# Patient Record
Sex: FEMALE | Race: BLACK OR AFRICAN AMERICAN | NOT HISPANIC OR LATINO | Employment: OTHER | URBAN - METROPOLITAN AREA
[De-identification: names, ages, dates, MRNs, and addresses within clinical notes are randomized per-mention and may not be internally consistent; named-entity substitution may affect disease eponyms.]

---

## 2017-10-26 ENCOUNTER — TRANSCRIBE ORDERS (OUTPATIENT)
Dept: OBSTETRICS AND GYNECOLOGY | Facility: CLINIC | Age: 62
End: 2017-10-26

## 2017-10-26 DIAGNOSIS — Z12.31 VISIT FOR SCREENING MAMMOGRAM: Primary | ICD-10-CM

## 2017-11-13 ENCOUNTER — APPOINTMENT (OUTPATIENT)
Dept: MAMMOGRAPHY | Facility: HOSPITAL | Age: 62
End: 2017-11-13
Attending: OBSTETRICS & GYNECOLOGY

## 2017-11-20 ENCOUNTER — HOSPITAL ENCOUNTER (OUTPATIENT)
Dept: MAMMOGRAPHY | Facility: HOSPITAL | Age: 62
Discharge: HOME | End: 2017-11-20
Attending: OBSTETRICS & GYNECOLOGY | Admitting: OBSTETRICS & GYNECOLOGY

## 2017-11-20 DIAGNOSIS — Z12.31 VISIT FOR SCREENING MAMMOGRAM: ICD-10-CM

## 2017-11-20 PROCEDURE — 77067 SCR MAMMO BI INCL CAD: CPT | Performed by: RADIOLOGY

## 2017-11-20 PROCEDURE — 77063 BREAST TOMOSYNTHESIS BI: CPT

## 2017-11-20 PROCEDURE — G0202 SCR MAMMO BI INCL CAD: HCPCS

## 2017-11-20 PROCEDURE — 77063 BREAST TOMOSYNTHESIS BI: CPT | Performed by: RADIOLOGY

## 2017-11-30 ENCOUNTER — OFFICE VISIT (OUTPATIENT)
Dept: OBSTETRICS AND GYNECOLOGY | Facility: CLINIC | Age: 62
End: 2017-11-30

## 2017-11-30 VITALS
WEIGHT: 242.2 LBS | DIASTOLIC BLOOD PRESSURE: 80 MMHG | BODY MASS INDEX: 38.92 KG/M2 | SYSTOLIC BLOOD PRESSURE: 138 MMHG | HEIGHT: 66 IN

## 2017-11-30 DIAGNOSIS — Z01.419 ENCOUNTER FOR GYNECOLOGICAL EXAMINATION WITHOUT ABNORMAL FINDING: ICD-10-CM

## 2017-11-30 DIAGNOSIS — Z78.0 MENOPAUSE: Primary | ICD-10-CM

## 2017-11-30 DIAGNOSIS — B37.31 RECURRENT CANDIDIASIS OF VAGINA: ICD-10-CM

## 2017-11-30 DIAGNOSIS — N95.2 VAGINAL ATROPHY: ICD-10-CM

## 2017-11-30 PROCEDURE — 99396 PREV VISIT EST AGE 40-64: CPT | Performed by: OBSTETRICS & GYNECOLOGY

## 2017-11-30 RX ORDER — INFLUENZA A VIRUS A/SINGAPORE/GP1908/2015 IVR-180A (H1N1) ANTIGEN (PROPIOLACTONE INACTIVATED), INFLUENZA A VIRUS A/SINGAPORE/INFIMH-16-0019/2016 IVR-186 (H3N2) ANTIGEN (PROPIOLACTONE INACTIVATED) AND INFLUENZA B VIRUS B/MARYLAND/15/2016 ANTIGEN (PROPIOLACTONE INACTIVATED) 15; 15; 15 UG/.5ML; UG/.5ML; UG/.5ML
INJECTION, SUSPENSION INTRAMUSCULAR
Refills: 0 | COMMUNITY
Start: 2017-10-30 | End: 2019-07-17

## 2017-11-30 RX ORDER — ESTRADIOL 0.1 MG/G
0.5 CREAM VAGINAL 2 TIMES WEEKLY
Qty: 42.5 G | Refills: 3 | Status: SHIPPED | OUTPATIENT
Start: 2017-11-30 | End: 2019-07-17 | Stop reason: ALTCHOICE

## 2017-11-30 RX ORDER — SPIRONOLACTONE 50 MG/1
1 TABLET, FILM COATED ORAL 2 TIMES DAILY
Refills: 0 | COMMUNITY
Start: 2017-11-12 | End: 2019-08-20 | Stop reason: SDUPTHER

## 2017-11-30 NOTE — PROGRESS NOTES
Chief Complaint   Patient presents with   • Gynecologic Exam   • Med Refill       Shireen Davenport is a 62 y.o. year old  presenting to be seen for her annual exam.This patient has previously had a vaginal hysterectomy/bilateral salpingo-oophorectomy.  She has also had a cholecystectomy.  She is menopausal and developed symptoms of vaginal atrophy.  She had Estrace cream, 0.5 g twice a week prescribed but discontinued this several months ago.  She is now symptomatic again.  She also has a history of recurrent vaginal candidiasis and is suppressed with boric acid suppositories, 600 mg 3 times a week successfully.  She has no other gynecologic complaints.    SCREENING TESTS    Year 2012 2016 2017   Age                         PAP     Neg.                    HPV high risk                         Mammogram   benign benign benign benign                   RENETTA score                         Breast MRI                         Lipids                         Vitamin D                         Colonoscopy                         DEXA  Frax (hip/any)    normal                     Ovarian Screen                             She exercises regularly: yes.  She wears her seat belt: yes.  She has concerns about domestic violence: no.  She has noticed changes in height: no    GYN screening history:  · Last pap: was done on approximately 2016 and the result was: normal PAP.  · Last mammogram: was done on approximately 2017 and the result was: Birads I (Normal).  · Last DEXA: was done on approximately 10/15/2015 and the results were: normal.    No Additional Complaints Reported    The following portions of the patient's history were reviewed and updated as appropriate:vital signs and   She  does not have any pertinent problems on file.  She  has a past surgical history that includes Hemorrhoid surgery; Cholecystectomy;  "cystoscopy bladder stone lithotripsy; vaginal hysterectomy salpingo oophorectomy (Bilateral, 1992); and Oophorectomy (Bilateral, 1992).  Her family history includes Ovarian cancer (age of onset: 55) in her mother. There is no history of Breast cancer.  She  reports that she has been smoking.  She has never used smokeless tobacco. She reports that she does not drink alcohol or use illicit drugs.  Current Outpatient Prescriptions   Medication Sig Dispense Refill   • AFLURIA PRESERVATIVE FREE 0.5 ML suspension prefilled syringe inject 0.5 milliliter intramuscularly  0   • amLODIPine (NORVASC) 5 MG tablet   0   • colestipol (COLESTID) 1 G tablet   0   • estradiol (ESTRACE VAGINAL) 0.1 MG/GM vaginal cream Insert 0.5 g into the vagina 2 (Two) Times a Week. 42.5 g 3   • fluticasone (FLONASE) 50 MCG/ACT nasal spray   0   • levothyroxine (SYNTHROID, LEVOTHROID) 150 MCG tablet   0   • sertraline (ZOLOFT) 50 MG tablet   0   • spironolactone (ALDACTONE) 50 MG tablet Take 1 tablet by mouth 2 (Two) Times a Day.  0     No current facility-administered medications for this visit.      She is allergic to penicillins..    Review of Systems  A comprehensive review of systems was taken.  Constitutional: negative for fever, chills, activity change, appetite change, fatigue and unexpected weight change.  Respiratory: negative  Cardiovascular: negative  Gastrointestinal: negative  Genitourinary:negative  Musculoskeletal:negative  Behavioral/Psych: negative       /80  Ht 66\" (167.6 cm)  Wt 242 lb 3.2 oz (110 kg)  BMI 39.09 kg/m2    Physical Exam    General:  alert; cooperative; well developed; well nourished  obese - Body mass index is 39.09 kg/(m^2).   Skin:  No suspicious lesions seen   Thyroid: normal to inspection and palpation   Lungs:  clear to auscultation bilaterally   Heart:  regular rate and rhythm, S1, S2 normal, no murmur, click, rub or gallop   Breasts:  Examined in supine position  Symmetric without masses or skin " dimpling  Nipples normal without inversion, lesions or discharge  There are no palpable axillary nodes   Abdomen: soft, non-tender; no masses  no umbilical or inginual hernias are present  no hepato-splenomegaly   Pelvis: Clinical staff was present for exam  External genitalia:  normal appearance of the external genitalia including Bartholin's and Barronett's glands.  Vaginal:  normal pink mucosa without prolapse or lesions.  Cervix:  absent.  Uterus:  absent.  Adnexa:  absent, bilateral.  Rectal:  anus visually normal appearing. recto-vaginal exam unremarkable and confirms findings;     Lab Review   No data reviewed    Imaging  Mammogram results- benign, and DEXA- normal         ASSESSMENT  Problems Addressed this Visit        Genitourinary    Menopause - Primary    Vaginal atrophy    Relevant Medications    estradiol (ESTRACE VAGINAL) 0.1 MG/GM vaginal cream      Other Visit Diagnoses     Encounter for gynecological examination without abnormal finding        Recurrent candidiasis of vagina              PLAN    Medications prescribed this encounter:    New Medications Ordered This Visit   Medications   • AFLURIA PRESERVATIVE FREE 0.5 ML suspension prefilled syringe     Sig: inject 0.5 milliliter intramuscularly     Refill:  0   • spironolactone (ALDACTONE) 50 MG tablet     Sig: Take 1 tablet by mouth 2 (Two) Times a Day.     Refill:  0   • estradiol (ESTRACE VAGINAL) 0.1 MG/GM vaginal cream     Sig: Insert 0.5 g into the vagina 2 (Two) Times a Week.     Dispense:  42.5 g     Refill:  3   · Boric acid suppositories 3 times a week  · Calcium, 600 mg/ Vit. D, 400 IU daily; regular weight-bearing exercise  · Follow up: 12 month(s)  *Please note that portions of this documentation may have been completed with a voice recognition program.  Efforts were made to edit this dictation, but occasional words may have been mistranscribed.       This note was electronically signed.    ANH Guillory MD  November 30, 2017  10:30  AM

## 2018-12-03 ENCOUNTER — TRANSCRIBE ORDERS (OUTPATIENT)
Dept: OBSTETRICS AND GYNECOLOGY | Facility: CLINIC | Age: 63
End: 2018-12-03

## 2018-12-03 DIAGNOSIS — Z12.31 VISIT FOR SCREENING MAMMOGRAM: Primary | ICD-10-CM

## 2019-07-17 ENCOUNTER — TELEPHONE (OUTPATIENT)
Dept: OBSTETRICS AND GYNECOLOGY | Facility: CLINIC | Age: 64
End: 2019-07-17

## 2019-07-17 ENCOUNTER — OFFICE VISIT (OUTPATIENT)
Dept: OBSTETRICS AND GYNECOLOGY | Facility: CLINIC | Age: 64
End: 2019-07-17

## 2019-07-17 VITALS
HEIGHT: 66 IN | SYSTOLIC BLOOD PRESSURE: 102 MMHG | BODY MASS INDEX: 37.19 KG/M2 | WEIGHT: 231.4 LBS | DIASTOLIC BLOOD PRESSURE: 56 MMHG

## 2019-07-17 DIAGNOSIS — Z01.411 ENCOUNTER FOR GYNECOLOGICAL EXAMINATION WITH ABNORMAL FINDING: ICD-10-CM

## 2019-07-17 DIAGNOSIS — N95.2 VAGINAL ATROPHY: ICD-10-CM

## 2019-07-17 DIAGNOSIS — B96.89 BV (BACTERIAL VAGINOSIS): ICD-10-CM

## 2019-07-17 DIAGNOSIS — R35.0 URINARY FREQUENCY: ICD-10-CM

## 2019-07-17 DIAGNOSIS — Z78.0 MENOPAUSE: Primary | ICD-10-CM

## 2019-07-17 DIAGNOSIS — N76.0 BV (BACTERIAL VAGINOSIS): ICD-10-CM

## 2019-07-17 PROCEDURE — 87210 SMEAR WET MOUNT SALINE/INK: CPT | Performed by: OBSTETRICS & GYNECOLOGY

## 2019-07-17 PROCEDURE — G0101 CA SCREEN;PELVIC/BREAST EXAM: HCPCS | Performed by: OBSTETRICS & GYNECOLOGY

## 2019-07-17 PROCEDURE — 83986 ASSAY PH BODY FLUID NOS: CPT | Performed by: OBSTETRICS & GYNECOLOGY

## 2019-07-17 RX ORDER — METRONIDAZOLE 7.5 MG/G
GEL VAGINAL
Qty: 70 G | Refills: 0 | Status: SHIPPED | OUTPATIENT
Start: 2019-07-17 | End: 2019-08-20

## 2019-07-17 NOTE — PROGRESS NOTES
Chief Complaint   Patient presents with   • Gynecologic Exam     c/o vaginal malodor   • Med Refill       Shireen Davenport is a 64 y.o. year old  presenting to be seen for her annual exam.  This patient has previously had a vaginal hysterectomy/bilateral salpingo-oophorectomy in .  She has had a cholecystectomy.  She does not use estrogen replacement therapy.  She denies significant menopausal symptoms.  She is treated for vaginal atrophy with estrogen vaginal cream, 0.5 g twice a week with relief of symptoms.  She has no side effects on this medication.  She has complaints of urinary frequency and malodor of her urine and vaginal secretions.  She has been seen by Dr. Susan Jr. and his been given nitrofurantoin 50 mg daily to take for suppression of recurrent cystitis.  She has not been taking this medication.  She denies bowel symptoms.    SCREENING TESTS    Year 2012   Age                         PAP     Neg.                    HPV high risk                         Mammogram      benign                   RENETTA score                         Breast MRI                         Lipids                         Vitamin D                         Colonoscopy                         DEXA  Frax (hip/any)    normal                     Ovarian Screen                             She exercises regularly: no.  She wears her seat belt: yes.  She has concerns about domestic violence: no.  She has noticed changes in height: no    GYN screening history:  · Last mammogram: was done on approximately 2017 and the result was: Birads I (Normal).  · Last DEXA: was done on approximately 10/15/2015 and the results were: normal.    No Additional Complaints Reported    The following portions of the patient's history were reviewed and updated as appropriate:vital signs and   She  has a past medical history of Chronic  "diarrhea, Depression, History of nephrolithiasis, Hypertension, Hypothyroidism, Menopause, and Vaginal atrophy.  She does not have any pertinent problems on file.  She  has a past surgical history that includes Hemorrhoid surgery; Cholecystectomy; cystoscopy bladder stone lithotripsy; vaginal hysterectomy salpingo oophorectomy (Bilateral, 1992); and Oophorectomy (Bilateral, 1992).  Her family history includes Ovarian cancer (age of onset: 55) in her mother.  She  reports that she has been smoking.  She has never used smokeless tobacco. She reports that she does not drink alcohol or use drugs.  Current Outpatient Medications   Medication Sig Dispense Refill   • amLODIPine (NORVASC) 5 MG tablet   0   • colestipol (COLESTID) 1 G tablet   0   • conjugated estrogens (PREMARIN) 0.625 MG/GM vaginal cream Insert 0.5 grams intravaginally, twice a week 30 g 2   • fluticasone (FLONASE) 50 MCG/ACT nasal spray   0   • levothyroxine (SYNTHROID, LEVOTHROID) 150 MCG tablet   0   • metroNIDAZOLE (METROGEL VAGINAL) 0.75 % vaginal gel Insert one application intravaginally at night for 5 days 70 g 0   • sertraline (ZOLOFT) 50 MG tablet   0   • spironolactone (ALDACTONE) 50 MG tablet Take 1 tablet by mouth 2 (Two) Times a Day.  0     No current facility-administered medications for this visit.      She is allergic to codeine; penicillins; and sulfa antibiotics..    Review of Systems  A comprehensive review of systems was taken.  Constitutional: negative for fever, chills, activity change, appetite change, fatigue and unexpected weight change.  Respiratory: negative  Cardiovascular: negative  Gastrointestinal: negative  Genitourinary:positive for frequency and urgency and malodor  Musculoskeletal:negative  Behavioral/Psych: negative       /56   Ht 167.6 cm (66\")   Wt 105 kg (231 lb 6.4 oz)   Breastfeeding? No   BMI 37.35 kg/m²     Physical Exam    General:  alert; cooperative; well developed; well nourished  obese - Body mass " index is 37.35 kg/m².   Skin:  No suspicious lesions seen   Thyroid: normal to inspection and palpation   Lungs:  clear to auscultation bilaterally   Heart:  RRR with no murmur, rub or gallop   Breasts:  Examined in supine position  Symmetric without masses or skin dimpling  Nipples normal without inversion, lesions or discharge  There are no palpable axillary nodes   Abdomen: soft, non-tender; no masses  no umbilical or inguinal hernias are present  no hepato-splenomegaly   Pelvis: Clinical staff was present for exam  External genitalia:  normal appearance of the external genitalia including Bartholin's and Holcomb's glands.  Vaginal:  discharge present -  watery; pH = 5.5 wet prep done: clue cells are present; Cath urine obtained  Cervix:  absent.  Uterus:  absent.  Adnexa:  absent, bilateral.  Rectal:  anus visually normal appearing. recto-vaginal exam unremarkable and confirms findings;     Lab Review   No data reviewed    Imaging  Mammogram results- Birads I         ASSESSMENT  Problems Addressed this Visit        Genitourinary    Menopause - Primary    Vaginal atrophy    Relevant Medications    conjugated estrogens (PREMARIN) 0.625 MG/GM vaginal cream      Other Visit Diagnoses     Urinary frequency        Relevant Orders    Urinalysis With Culture If Indicated - Urine, Catheter In/Out    BV (bacterial vaginosis)        Relevant Medications    metroNIDAZOLE (METROGEL VAGINAL) 0.75 % vaginal gel    Encounter for gynecological examination with abnormal finding              PLAN    Medications prescribed this encounter:    New Medications Ordered This Visit   Medications   • conjugated estrogens (PREMARIN) 0.625 MG/GM vaginal cream     Sig: Insert 0.5 grams intravaginally, twice a week     Dispense:  30 g     Refill:  2   • metroNIDAZOLE (METROGEL VAGINAL) 0.75 % vaginal gel     Sig: Insert one application intravaginally at night for 5 days     Dispense:  70 g     Refill:  0   · Catheterized urine specimen  obtained.  If the patient has infection I will treat the cystitis with ciprofloxacin, 500 mg twice daily for 5 days since she is allergic to sulfa drugs.  · I have encouraged the patient to resume her daily nitrofurantoin suppression in a dose of 50 mg daily.  · Monthly self breast assessment and annual breast imaging  · Calcium, 600 mg/ Vit. D, 400 IU daily; regular weight-bearing exercise  · Follow up: 12 month(s)  *Please note that portions of this documentation may have been completed with a voice recognition program.  Efforts were made to edit this dictation, but occasional words may have been mistranscribed.       This note was electronically signed.    ANH Guillory MD  July 17, 2019  10:27 AM

## 2019-07-17 NOTE — TELEPHONE ENCOUNTER
Patient was given a prescription for Metrogel vaginal to treat bacterial vaginosis today.  A cath urine was also sent to the lab and the results are pending.  The patient calls to request oral metronidazole because the pharmacist told her it would be cheaper.  I explained to her that if she has a UTI and we need to treat that with antibiotics, it would be difficult for her to tolerate both of those medications orally.  She will wait until tomorrow to find out urinalysis results. If she does not have UTI, will request oral metronidazole.  If she does have a UTI, she will use Metrogel vaginal along with whatever oral antibiotic Dr. Guillory prescribes to treat UTI.

## 2019-07-18 ENCOUNTER — TELEPHONE (OUTPATIENT)
Dept: OBSTETRICS AND GYNECOLOGY | Facility: CLINIC | Age: 64
End: 2019-07-18

## 2019-07-18 DIAGNOSIS — N30.00 ACUTE CYSTITIS WITHOUT HEMATURIA: Primary | ICD-10-CM

## 2019-07-18 RX ORDER — CIPROFLOXACIN 500 MG/1
500 TABLET, FILM COATED ORAL 2 TIMES DAILY
Qty: 10 TABLET | Refills: 0 | Status: SHIPPED | OUTPATIENT
Start: 2019-07-18 | End: 2019-07-23

## 2019-07-18 NOTE — TELEPHONE ENCOUNTER
No answer on cell number, but I left a message asking Shireen to call the office in the morning.  Home number disconnected.

## 2019-07-19 NOTE — TELEPHONE ENCOUNTER
Patient was informed that she has a urinary tract infection.  Dr. Guillory has sent a prescription for Cipro to the patient's pharmacy.  She will take as directed and was instructed to contact the office if she has problems after finishing medication.

## 2019-07-19 NOTE — TELEPHONE ENCOUNTER
Attempted to contact patient by telephone.  No answer, but I left a message asking Shireen to return my call.

## 2019-08-20 ENCOUNTER — OFFICE VISIT (OUTPATIENT)
Dept: INTERNAL MEDICINE | Facility: CLINIC | Age: 64
End: 2019-08-20

## 2019-08-20 VITALS
HEART RATE: 63 BPM | DIASTOLIC BLOOD PRESSURE: 76 MMHG | HEIGHT: 66 IN | TEMPERATURE: 98 F | BODY MASS INDEX: 37.61 KG/M2 | OXYGEN SATURATION: 99 % | WEIGHT: 234 LBS | RESPIRATION RATE: 20 BRPM | SYSTOLIC BLOOD PRESSURE: 122 MMHG

## 2019-08-20 DIAGNOSIS — J30.89 ENVIRONMENTAL AND SEASONAL ALLERGIES: ICD-10-CM

## 2019-08-20 DIAGNOSIS — N32.89 BLADDER SPASMS: ICD-10-CM

## 2019-08-20 DIAGNOSIS — M53.82 DECREASED ROM OF INTERVERTEBRAL DISCS OF CERVICAL SPINE: ICD-10-CM

## 2019-08-20 DIAGNOSIS — N95.2 VAGINAL ATROPHY: ICD-10-CM

## 2019-08-20 DIAGNOSIS — N88.8 CERVICAL CYST: Primary | ICD-10-CM

## 2019-08-20 DIAGNOSIS — F32.A DEPRESSION, UNSPECIFIED DEPRESSION TYPE: ICD-10-CM

## 2019-08-20 DIAGNOSIS — R19.7 DIARRHEA, UNSPECIFIED TYPE: ICD-10-CM

## 2019-08-20 DIAGNOSIS — I10 ESSENTIAL HYPERTENSION: ICD-10-CM

## 2019-08-20 DIAGNOSIS — M54.2 CERVICAL PAIN: ICD-10-CM

## 2019-08-20 DIAGNOSIS — E03.9 HYPOTHYROIDISM, UNSPECIFIED TYPE: ICD-10-CM

## 2019-08-20 DIAGNOSIS — L40.9 PSORIASIS: ICD-10-CM

## 2019-08-20 PROCEDURE — 99204 OFFICE O/P NEW MOD 45 MIN: CPT | Performed by: NURSE PRACTITIONER

## 2019-08-20 RX ORDER — LEVOTHYROXINE SODIUM 0.12 MG/1
125 TABLET ORAL DAILY
Qty: 90 TABLET | Refills: 2 | Status: SHIPPED | OUTPATIENT
Start: 2019-08-20 | End: 2020-01-03 | Stop reason: SDUPTHER

## 2019-08-20 RX ORDER — MONTELUKAST SODIUM 4 MG/1
1 TABLET, CHEWABLE ORAL 2 TIMES DAILY
Qty: 180 TABLET | Refills: 2 | Status: SHIPPED | OUTPATIENT
Start: 2019-08-20 | End: 2020-01-24 | Stop reason: SDUPTHER

## 2019-08-20 RX ORDER — CETIRIZINE HYDROCHLORIDE, PSEUDOEPHEDRINE HYDROCHLORIDE 5; 120 MG/1; MG/1
TABLET, FILM COATED, EXTENDED RELEASE ORAL
Status: ON HOLD | COMMUNITY
End: 2022-06-16

## 2019-08-20 RX ORDER — ASPIRIN 81 MG/1
81 TABLET, CHEWABLE ORAL DAILY
COMMUNITY

## 2019-08-20 RX ORDER — CLOBETASOL PROPIONATE 0.46 MG/ML
SOLUTION TOPICAL
Refills: 0 | COMMUNITY
Start: 2019-08-03 | End: 2019-08-20

## 2019-08-20 RX ORDER — METHENAMINE HIPPURATE 1000 MG/1
1 TABLET ORAL 2 TIMES DAILY WITH MEALS
COMMUNITY
End: 2019-08-20 | Stop reason: SDUPTHER

## 2019-08-20 RX ORDER — IRBESARTAN 150 MG/1
150 TABLET ORAL DAILY
Refills: 0 | COMMUNITY
Start: 2019-07-11 | End: 2019-08-20 | Stop reason: SDUPTHER

## 2019-08-20 RX ORDER — SPIRONOLACTONE 50 MG/1
50 TABLET, FILM COATED ORAL DAILY
Qty: 90 TABLET | Refills: 2 | Status: SHIPPED | OUTPATIENT
Start: 2019-08-20

## 2019-08-20 RX ORDER — IRBESARTAN 150 MG/1
150 TABLET ORAL DAILY
Qty: 90 TABLET | Refills: 2 | Status: SHIPPED | OUTPATIENT
Start: 2019-08-20

## 2019-08-20 RX ORDER — FLUTICASONE PROPIONATE 50 MCG
2 SPRAY, SUSPENSION (ML) NASAL DAILY
Qty: 3 BOTTLE | Refills: 2 | Status: SHIPPED | OUTPATIENT
Start: 2019-08-20

## 2019-08-20 RX ORDER — AMLODIPINE BESYLATE 5 MG/1
5 TABLET ORAL DAILY
Qty: 90 TABLET | Refills: 2 | Status: SHIPPED | OUTPATIENT
Start: 2019-08-20 | End: 2020-01-10 | Stop reason: SDUPTHER

## 2019-08-20 RX ORDER — LEVOTHYROXINE SODIUM 0.12 MG/1
TABLET ORAL
Refills: 0 | COMMUNITY
Start: 2019-08-03 | End: 2019-08-20 | Stop reason: SDUPTHER

## 2019-08-20 RX ORDER — METHENAMINE HIPPURATE 1000 MG/1
1 TABLET ORAL 2 TIMES DAILY WITH MEALS
Qty: 180 TABLET | Refills: 2 | Status: ON HOLD | OUTPATIENT
Start: 2019-08-20 | End: 2022-06-16

## 2019-08-20 RX ORDER — CLOBETASOL PROPIONATE 0.46 MG/ML
SOLUTION TOPICAL 2 TIMES DAILY
Qty: 150 ML | Refills: 2 | Status: ON HOLD | OUTPATIENT
Start: 2019-08-20 | End: 2022-06-16

## 2019-08-20 NOTE — PROGRESS NOTES
Subjective   Chief Complaint   Patient presents with   • Hypertension   • Cyst      Shireen Davenport is a 64 y.o. female here today to establish care.  She developed a cyst at the base of the back of her neck about 1 year ago and she has noticed this has grown in size especially in the past month.  She has now developed extreme tenderness in this area with decreased range of motion of her neck.  She has not had any imaging or assessment of this.  She has been experiencing an increase in depression and anxiety recently due to some environmental changes.  Her  passed away about 1 year ago when she is having to sell her home.  She has been taking Zoloft and this does help with her depression some.  She has not been sleeping well.  She has history of hypertension that has been currently stable on medications.  She has been taking same dose of Synthroid for hypothyroidism for several years.  She has history of bladder spasms and has been prescribed HipRex that greatly helps her symptoms.  She has psoriasis and has been using a clobetasol solution that helps especially on her scalp.  She was previously prescribed Soriatane but stopped taking this due to improved symptoms.  She had her gallbladder removed several years ago and has had some diarrhea since.  She has been taking Colestid which helps the diarrhea.  She has postmenopausal symptoms with vaginal atrophy and has been using Premarin cream that helps with vaginal dryness.  She denies any shortness of air or chest pain today.      The following portions of the patient's history were reviewed and updated as appropriate: allergies, current medications, past family history, past medical history, past social history, past surgical history and problem list.    Review of Systems   Constitutional: Positive for activity change and fatigue. Negative for appetite change, chills, diaphoresis, fever, unexpected weight gain and unexpected weight loss.   HENT: Negative for  "ear discharge, ear pain, mouth sores, nosebleeds, sinus pressure, sneezing and sore throat.    Eyes: Negative for pain, discharge and itching.   Respiratory: Negative for cough, chest tightness, shortness of breath and wheezing.    Cardiovascular: Negative for chest pain, palpitations and leg swelling.   Gastrointestinal: Positive for diarrhea. Negative for abdominal pain, constipation, nausea and vomiting.   Endocrine: Negative for heat intolerance, polydipsia and polyphagia.   Genitourinary: Negative for dysuria, flank pain, frequency, hematuria and urgency.   Musculoskeletal: Positive for arthralgias, back pain, myalgias, neck pain and neck stiffness. Negative for gait problem and joint swelling.   Skin: Positive for rash. Negative for color change and pallor.   Allergic/Immunologic: Positive for environmental allergies. Negative for immunocompromised state.   Neurological: Negative for seizures, speech difficulty, weakness and numbness.   Hematological: Negative for adenopathy.   Psychiatric/Behavioral: Positive for sleep disturbance and depressed mood. Negative for agitation and decreased concentration. The patient is nervous/anxious.        Current Outpatient Medications on File Prior to Visit   Medication Sig   • aspirin 81 MG chewable tablet Chew 81 mg Daily.   • cetirizine-pseudoephedrine (ZYRTEC-D ALLERGY & CONGESTION) 5-120 MG per 12 hr tablet Zyrtec-D 5 mg-120 mg tablet,extended release   Daily     No current facility-administered medications on file prior to visit.        Objective   Vitals:    08/20/19 1422   BP: 122/76   Pulse: 63   Resp: 20   Temp: 98 °F (36.7 °C)   SpO2: 99%   Weight: 106 kg (234 lb)   Height: 167.6 cm (66\")     Body mass index is 37.77 kg/m².    Physical Exam   Constitutional: She is oriented to person, place, and time. She appears well-developed and well-nourished.   HENT:   Head: Normocephalic and atraumatic.   Nose: Nose normal.   Eyes: EOM are normal. Pupils are equal, round, " and reactive to light.   Neck: Trachea normal and normal range of motion. No thyromegaly present.   Cardiovascular: Normal rate, regular rhythm and normal heart sounds.   Pulmonary/Chest: Effort normal and breath sounds normal.   Abdominal: Soft. Bowel sounds are normal. There is no tenderness.   Musculoskeletal:        Cervical back: She exhibits decreased range of motion, tenderness and pain.   Soft cyst sitting at C7 with tenderness on palpation.    Neurological: She is alert and oriented to person, place, and time. She has normal strength. GCS eye subscore is 4. GCS verbal subscore is 5. GCS motor subscore is 6.   Skin: Skin is warm and dry.   Psychiatric: Her speech is normal and behavior is normal. Thought content normal. Her mood appears anxious. Cognition and memory are normal. She exhibits a depressed mood.   Vitals reviewed.      Assessment/Plan   Shireen was seen today for hypertension and cyst.    Diagnoses and all orders for this visit:    Cervical cyst  - new condition requiring further work-up  -     MRI Cervical Spine Without Contrast; Future    Depression, unspecified depression type -chronic condition that has currently worsened but patient does not want to change treatment regimen at this time.  Will revisit this at next appointment.  -     sertraline (ZOLOFT) 50 MG tablet; Take 1 tablet by mouth Daily.    Essential hypertension -chronic condition, stable  -     amLODIPine (NORVASC) 5 MG tablet; Take 1 tablet by mouth Daily.  -     irbesartan (AVAPRO) 150 MG tablet; Take 1 tablet by mouth Daily.  -     spironolactone (ALDACTONE) 50 MG tablet; Take 1 tablet by mouth Daily.    Environmental and seasonal allergies - chronic condition, stable  -     fluticasone (FLONASE) 50 MCG/ACT nasal spray; 2 sprays into the nostril(s) as directed by provider Daily.    Vaginal atrophy -chronic condition, stable  -     conjugated estrogens (PREMARIN) 0.625 MG/GM vaginal cream; 0.5 grams by vaginal route 2 X  week    Hypothyroidism, unspecified type -chronic condition, stable  -     levothyroxine (SYNTHROID, LEVOTHROID) 125 MCG tablet; Take 1 tablet by mouth Daily.    Bladder spasms -chronic condition, stable  -     methenamine (HIPREX) 1 g tablet; Take 1 tablet by mouth 2 (Two) Times a Day With Meals.    Diarrhea, unspecified type -chronic condition, stable  -     colestipol (COLESTID) 1 g tablet; Take 1 tablet by mouth 2 (Two) Times a Day.    Psoriasis -chronic condition, stable  -     clobetasol (TEMOVATE) 0.05 % external solution; Apply  topically to the appropriate area as directed 2 (Two) Times a Day.    Cervical pain -new condition requiring further work-up  -     MRI Cervical Spine Without Contrast; Future    Decreased ROM of intervertebral discs of cervical spine - new condition requiring further work-up  -     MRI Cervical Spine Without Contrast; Future           Current Outpatient Medications:   •  amLODIPine (NORVASC) 5 MG tablet, Take 1 tablet by mouth Daily., Disp: 90 tablet, Rfl: 2  •  aspirin 81 MG chewable tablet, Chew 81 mg Daily., Disp: , Rfl:   •  cetirizine-pseudoephedrine (ZYRTEC-D ALLERGY & CONGESTION) 5-120 MG per 12 hr tablet, Zyrtec-D 5 mg-120 mg tablet,extended release  Daily, Disp: , Rfl:   •  clobetasol (TEMOVATE) 0.05 % external solution, Apply  topically to the appropriate area as directed 2 (Two) Times a Day., Disp: 150 mL, Rfl: 2  •  colestipol (COLESTID) 1 g tablet, Take 1 tablet by mouth 2 (Two) Times a Day., Disp: 180 tablet, Rfl: 2  •  irbesartan (AVAPRO) 150 MG tablet, Take 1 tablet by mouth Daily., Disp: 90 tablet, Rfl: 2  •  levothyroxine (SYNTHROID, LEVOTHROID) 125 MCG tablet, Take 1 tablet by mouth Daily., Disp: 90 tablet, Rfl: 2  •  methenamine (HIPREX) 1 g tablet, Take 1 tablet by mouth 2 (Two) Times a Day With Meals., Disp: 180 tablet, Rfl: 2  •  sertraline (ZOLOFT) 50 MG tablet, Take 1 tablet by mouth Daily., Disp: 90 tablet, Rfl: 2  •  spironolactone (ALDACTONE) 50 MG  tablet, Take 1 tablet by mouth Daily., Disp: 90 tablet, Rfl: 2  •  conjugated estrogens (PREMARIN) 0.625 MG/GM vaginal cream, 0.5 grams by vaginal route 2 X week, Disp: 6 each, Rfl: 2  •  fluticasone (FLONASE) 50 MCG/ACT nasal spray, 2 sprays into the nostril(s) as directed by provider Daily., Disp: 3 bottle, Rfl: 2       Plan of care reviewed with the patient at the conclusion of today's visit.  Education was provided regarding diagnosis, management, and any prescribed or recommended OTC medications.  Patient verbalized understanding of and agreement with management plan.     Return in about 6 months (around 2/20/2020), or if symptoms worsen or fail to improve.      Sharon Veronica, APRN

## 2019-08-21 ENCOUNTER — LAB (OUTPATIENT)
Dept: INTERNAL MEDICINE | Facility: CLINIC | Age: 64
End: 2019-08-21

## 2019-08-21 DIAGNOSIS — Z00.00 ROUTINE GENERAL MEDICAL EXAMINATION AT A HEALTH CARE FACILITY: ICD-10-CM

## 2019-08-21 DIAGNOSIS — E03.8 OTHER SPECIFIED HYPOTHYROIDISM: ICD-10-CM

## 2019-08-21 DIAGNOSIS — E55.9 VITAMIN D DEFICIENCY: ICD-10-CM

## 2019-08-21 DIAGNOSIS — I10 ESSENTIAL HYPERTENSION: Primary | ICD-10-CM

## 2019-08-21 DIAGNOSIS — Z13.0 SCREENING FOR BLOOD DISEASE: ICD-10-CM

## 2019-08-22 LAB
25(OH)D3+25(OH)D2 SERPL-MCNC: 32.3 NG/ML (ref 30–100)
ALBUMIN SERPL-MCNC: 4 G/DL (ref 3.5–5.2)
ALBUMIN/GLOB SERPL: 1.5 G/DL
ALP SERPL-CCNC: 95 U/L (ref 39–117)
ALT SERPL-CCNC: 9 U/L (ref 1–33)
AST SERPL-CCNC: 15 U/L (ref 1–32)
BASOPHILS # BLD AUTO: 0.03 10*3/MM3 (ref 0–0.2)
BASOPHILS NFR BLD AUTO: 0.5 % (ref 0–1.5)
BILIRUB SERPL-MCNC: 0.2 MG/DL (ref 0.2–1.2)
BUN SERPL-MCNC: 12 MG/DL (ref 8–23)
BUN/CREAT SERPL: 15.8 (ref 7–25)
CALCIUM SERPL-MCNC: 9 MG/DL (ref 8.6–10.5)
CHLORIDE SERPL-SCNC: 107 MMOL/L (ref 98–107)
CHOLEST SERPL-MCNC: 152 MG/DL (ref 0–200)
CO2 SERPL-SCNC: 22.7 MMOL/L (ref 22–29)
CREAT SERPL-MCNC: 0.76 MG/DL (ref 0.57–1)
EOSINOPHIL # BLD AUTO: 0.11 10*3/MM3 (ref 0–0.4)
EOSINOPHIL NFR BLD AUTO: 1.9 % (ref 0.3–6.2)
ERYTHROCYTE [DISTWIDTH] IN BLOOD BY AUTOMATED COUNT: 14.6 % (ref 12.3–15.4)
FOLATE SERPL-MCNC: >20 NG/ML (ref 4.78–24.2)
GLOBULIN SER CALC-MCNC: 2.6 GM/DL
GLUCOSE SERPL-MCNC: 89 MG/DL (ref 65–99)
HCT VFR BLD AUTO: 37.7 % (ref 34–46.6)
HDLC SERPL-MCNC: 49 MG/DL (ref 40–60)
HGB BLD-MCNC: 11.3 G/DL (ref 12–15.9)
IMM GRANULOCYTES # BLD AUTO: 0.01 10*3/MM3 (ref 0–0.05)
IMM GRANULOCYTES NFR BLD AUTO: 0.2 % (ref 0–0.5)
LDLC SERPL CALC-MCNC: 85 MG/DL (ref 0–100)
LYMPHOCYTES # BLD AUTO: 2.48 10*3/MM3 (ref 0.7–3.1)
LYMPHOCYTES NFR BLD AUTO: 42.6 % (ref 19.6–45.3)
MCH RBC QN AUTO: 26.7 PG (ref 26.6–33)
MCHC RBC AUTO-ENTMCNC: 30 G/DL (ref 31.5–35.7)
MCV RBC AUTO: 89.1 FL (ref 79–97)
MONOCYTES # BLD AUTO: 0.44 10*3/MM3 (ref 0.1–0.9)
MONOCYTES NFR BLD AUTO: 7.6 % (ref 5–12)
NEUTROPHILS # BLD AUTO: 2.75 10*3/MM3 (ref 1.7–7)
NEUTROPHILS NFR BLD AUTO: 47.2 % (ref 42.7–76)
NRBC BLD AUTO-RTO: 0 /100 WBC (ref 0–0.2)
PLATELET # BLD AUTO: 339 10*3/MM3 (ref 140–450)
POTASSIUM SERPL-SCNC: 4.1 MMOL/L (ref 3.5–5.2)
PROT SERPL-MCNC: 6.6 G/DL (ref 6–8.5)
RBC # BLD AUTO: 4.23 10*6/MM3 (ref 3.77–5.28)
SODIUM SERPL-SCNC: 144 MMOL/L (ref 136–145)
TRIGL SERPL-MCNC: 92 MG/DL (ref 0–150)
TSH SERPL DL<=0.005 MIU/L-ACNC: 1.12 MIU/ML (ref 0.27–4.2)
VIT B12 SERPL-MCNC: 430 PG/ML (ref 211–946)
VLDLC SERPL CALC-MCNC: 18.4 MG/DL
WBC # BLD AUTO: 5.82 10*3/MM3 (ref 3.4–10.8)

## 2019-08-22 NOTE — PROGRESS NOTES
Please contact patient and advise that she is borderline anemic so increase your iron intake by eating foods high in iron, taking an over the counter iron supplement once in the morning, or cooking from cast iron skillet. All other labs are normal and look great.

## 2019-08-23 PROBLEM — J30.89 ENVIRONMENTAL AND SEASONAL ALLERGIES: Status: ACTIVE | Noted: 2019-08-23

## 2019-08-23 PROBLEM — F32.A DEPRESSION: Status: ACTIVE | Noted: 2019-08-23

## 2019-08-23 PROBLEM — N32.89 BLADDER SPASMS: Status: ACTIVE | Noted: 2019-08-23

## 2019-08-23 PROBLEM — L40.9 PSORIASIS: Status: ACTIVE | Noted: 2019-08-23

## 2019-08-23 PROBLEM — R19.7 DIARRHEA: Status: ACTIVE | Noted: 2019-08-23

## 2019-08-28 ENCOUNTER — TELEPHONE (OUTPATIENT)
Dept: INTERNAL MEDICINE | Facility: CLINIC | Age: 64
End: 2019-08-28

## 2019-09-25 ENCOUNTER — TELEPHONE (OUTPATIENT)
Dept: OBSTETRICS AND GYNECOLOGY | Facility: CLINIC | Age: 64
End: 2019-09-25

## 2019-09-25 NOTE — TELEPHONE ENCOUNTER
Patient calls with recurrent vaginal odor.  She was treated for BV, and this resolved, but has returned.  She will see Dr. Guillory tomorrow afternoon for evaluation.

## 2019-09-26 ENCOUNTER — OFFICE VISIT (OUTPATIENT)
Dept: OBSTETRICS AND GYNECOLOGY | Facility: CLINIC | Age: 64
End: 2019-09-26

## 2019-09-26 ENCOUNTER — LAB REQUISITION (OUTPATIENT)
Dept: LAB | Facility: HOSPITAL | Age: 64
End: 2019-09-26

## 2019-09-26 VITALS
SYSTOLIC BLOOD PRESSURE: 120 MMHG | BODY MASS INDEX: 37.22 KG/M2 | DIASTOLIC BLOOD PRESSURE: 78 MMHG | HEIGHT: 66 IN | WEIGHT: 231.6 LBS

## 2019-09-26 DIAGNOSIS — B37.31 VAGINAL CANDIDIASIS: ICD-10-CM

## 2019-09-26 DIAGNOSIS — Z00.00 ROUTINE GENERAL MEDICAL EXAMINATION AT A HEALTH CARE FACILITY: ICD-10-CM

## 2019-09-26 DIAGNOSIS — N95.2 VAGINAL ATROPHY: ICD-10-CM

## 2019-09-26 DIAGNOSIS — Z78.0 MENOPAUSE: Primary | ICD-10-CM

## 2019-09-26 DIAGNOSIS — R35.0 URINARY FREQUENCY: ICD-10-CM

## 2019-09-26 PROCEDURE — 83986 ASSAY PH BODY FLUID NOS: CPT | Performed by: OBSTETRICS & GYNECOLOGY

## 2019-09-26 PROCEDURE — 99213 OFFICE O/P EST LOW 20 MIN: CPT | Performed by: OBSTETRICS & GYNECOLOGY

## 2019-09-26 PROCEDURE — 36415 COLL VENOUS BLD VENIPUNCTURE: CPT | Performed by: OBSTETRICS & GYNECOLOGY

## 2019-09-26 PROCEDURE — 87210 SMEAR WET MOUNT SALINE/INK: CPT | Performed by: OBSTETRICS & GYNECOLOGY

## 2019-09-26 RX ORDER — FLUCONAZOLE 150 MG/1
150 TABLET ORAL EVERY OTHER DAY
Qty: 3 TABLET | Refills: 0 | Status: SHIPPED | OUTPATIENT
Start: 2019-09-26 | End: 2019-10-01

## 2019-09-26 RX ORDER — FERROUS SULFATE 325(65) MG
325 TABLET ORAL
Status: ON HOLD | COMMUNITY
End: 2022-06-16

## 2019-09-26 NOTE — PROGRESS NOTES
"Chief Complaint   Patient presents with   • Vaginitis     recurrent malodor.  s/p treatment for BV and UTI in 2019.       Shireen Davenport is a 64 y.o. year old  presenting to be seen for evaluation of vaginal symptoms.  This patient was treated for bacterial vaginosis in 2019.  She now has complaints of vulvar irritation and increased vaginal discharge as well as odor.  She desires to be screened for recurrent infection.  She was also treated for cystitis in 2019 and is having recurrent urinary frequency.  She desires screening.  She does have some leakage of stool at times and I have cautioned her about appropriate hygiene.  She has previously had a vaginal hysterectomy/bilateral salpingo-oophorectomy.       A comprehensive review of systems was done.  Constitutional: negative for fever, chills, activity change, appetite change, fatigue and unexpected weight change.  Respiratory: negative  Cardiovascular: negative  Gastrointestinal: negative  Genitourinary:negative  Musculoskeletal:negative  Behavioral/Psych: negative  Physical exam:  Lungs- Clear to auscultation  Heart- RRR with no murmur, rub or gallop  Abdomen- Soft, non-tender with no organomegaly  /78   Ht 167.6 cm (66\")   Wt 105 kg (231 lb 9.6 oz)   Breastfeeding? No   BMI 37.38 kg/m²      Pelvis:Clinical staff was present for exam  External genitalia:  normal appearance of the external genitalia including Bartholin's and Appling's glands.  Vaginal:  discharge present -  white and thick; pH = 4.0 wet prep done: pseudo-hyphae are present;  Cervix:  absent.  Uterus:  absent.  Adnexa:  absent, bilateral.     ASSESSMENT  Problems Addressed this Visit        Genitourinary    Menopause - Primary    Vaginal atrophy      Other Visit Diagnoses     Urinary frequency        Relevant Orders    Urinalysis With Culture If Indicated - Urine, Catheter In/Out    Vaginal candidiasis        Relevant Medications    fluconazole (DIFLUCAN) 150 MG " tablet          PLAN   Medications prescribed this encounter:    New Medications Ordered This Visit   Medications   • fluconazole (DIFLUCAN) 150 MG tablet     Sig: Take 1 tablet by mouth Every Other Day for 3 doses. 1 Every other day     Dispense:  3 tablet     Refill:  0   1. Continue monthly self breast assessment and annual breast imaging  2. I have cautioned the patient about stool contamination and advised her in appropriate hygiene  3. Cath UA sent  4. Follow up: 10 month(s) for AG  *Please note that portions of this documentation may have been completed with a voice recognition program.  Efforts were made to edit this dictation, but occasional words may have been mistranscribed.      This note was electronically signed.    ANH Guillory MD  September 26, 2019  4:08 PM

## 2019-10-04 ENCOUNTER — TRANSCRIBE ORDERS (OUTPATIENT)
Dept: ADMINISTRATIVE | Facility: HOSPITAL | Age: 64
End: 2019-10-04

## 2019-10-04 DIAGNOSIS — Z12.31 VISIT FOR SCREENING MAMMOGRAM: Primary | ICD-10-CM

## 2019-10-09 RX ORDER — NITROFURANTOIN 25; 75 MG/1; MG/1
100 CAPSULE ORAL 2 TIMES DAILY
Qty: 20 CAPSULE | Refills: 0 | Status: SHIPPED | OUTPATIENT
Start: 2019-10-09 | End: 2019-10-19

## 2019-10-10 ENCOUNTER — HOSPITAL ENCOUNTER (OUTPATIENT)
Dept: MAMMOGRAPHY | Facility: HOSPITAL | Age: 64
Discharge: HOME | End: 2019-10-10
Admitting: OBSTETRICS & GYNECOLOGY

## 2019-10-10 DIAGNOSIS — Z12.31 VISIT FOR SCREENING MAMMOGRAM: ICD-10-CM

## 2019-10-10 PROCEDURE — 77067 SCR MAMMO BI INCL CAD: CPT | Performed by: RADIOLOGY

## 2019-10-10 PROCEDURE — 77067 SCR MAMMO BI INCL CAD: CPT

## 2019-10-10 PROCEDURE — 77063 BREAST TOMOSYNTHESIS BI: CPT

## 2019-10-10 PROCEDURE — 77063 BREAST TOMOSYNTHESIS BI: CPT | Performed by: RADIOLOGY

## 2020-01-03 ENCOUNTER — TELEPHONE (OUTPATIENT)
Dept: INTERNAL MEDICINE | Facility: CLINIC | Age: 65
End: 2020-01-03

## 2020-01-03 DIAGNOSIS — E03.9 HYPOTHYROIDISM, UNSPECIFIED TYPE: ICD-10-CM

## 2020-01-03 RX ORDER — LEVOTHYROXINE SODIUM 0.12 MG/1
125 TABLET ORAL DAILY
Qty: 90 TABLET | Refills: 2 | Status: SHIPPED | OUTPATIENT
Start: 2020-01-03

## 2020-01-03 NOTE — TELEPHONE ENCOUNTER
Pt called because she recently moved and she is completely out of her levothyroxine (SYNTHROID, LEVOTHROID) 125 MCG tablet. Pt will now need meds sent to     [facility] on [address] in [place].

## 2020-01-06 ENCOUNTER — TELEPHONE (OUTPATIENT)
Dept: OBSTETRICS AND GYNECOLOGY | Facility: CLINIC | Age: 65
End: 2020-01-06

## 2020-01-06 ENCOUNTER — TELEPHONE (OUTPATIENT)
Dept: INTERNAL MEDICINE | Facility: CLINIC | Age: 65
End: 2020-01-06

## 2020-01-06 DIAGNOSIS — I10 ESSENTIAL HYPERTENSION: ICD-10-CM

## 2020-01-06 RX ORDER — IRBESARTAN 150 MG/1
150 TABLET ORAL DAILY
Qty: 90 TABLET | Refills: 2 | Status: CANCELLED | OUTPATIENT
Start: 2020-01-06

## 2020-01-06 NOTE — TELEPHONE ENCOUNTER
Patient has moved to [place], [place].  Has an appointment to see Dr. Guillory in July 2020 for annual exam.  She calls today requesting a prescription for Premarin vaginal cream.  I did let her know that we can send this in, but if she does not keep appointment in July, we will not be able to prescribe anything further.

## 2020-01-10 ENCOUNTER — TELEPHONE (OUTPATIENT)
Dept: INTERNAL MEDICINE | Facility: CLINIC | Age: 65
End: 2020-01-10

## 2020-01-10 DIAGNOSIS — I10 ESSENTIAL HYPERTENSION: ICD-10-CM

## 2020-01-10 RX ORDER — AMLODIPINE BESYLATE 5 MG/1
5 TABLET ORAL DAILY
Qty: 90 TABLET | Refills: 2 | Status: SHIPPED | OUTPATIENT
Start: 2020-01-10

## 2020-01-10 NOTE — TELEPHONE ENCOUNTER
PT CALLED IN REQUESTING A REFILL ON HER amLODIPine (NORVASC) 5 MG tablet      CB NUMBER: [phone]  [facility]: [address]  FAX# [phone]

## 2020-01-22 ENCOUNTER — TELEPHONE (OUTPATIENT)
Dept: INTERNAL MEDICINE | Facility: CLINIC | Age: 65
End: 2020-01-22

## 2020-01-22 DIAGNOSIS — R19.7 DIARRHEA, UNSPECIFIED TYPE: ICD-10-CM

## 2020-01-22 RX ORDER — MONTELUKAST SODIUM 4 MG/1
1 TABLET, CHEWABLE ORAL 2 TIMES DAILY
Qty: 180 TABLET | Refills: 2 | Status: CANCELLED | OUTPATIENT
Start: 2020-01-22

## 2020-01-22 NOTE — TELEPHONE ENCOUNTER
Patient called in requesting to have the selected med refilled.colestipol (COLESTID) 1 g tablet. Pt would also like to have something called In for a yeast infection.     Pt. Call back [phone]  [facility] Carol IGNACIO, Kathy Boston confirmed.

## 2020-01-24 ENCOUNTER — TELEPHONE (OUTPATIENT)
Dept: INTERNAL MEDICINE | Facility: CLINIC | Age: 65
End: 2020-01-24

## 2020-01-24 DIAGNOSIS — R19.7 DIARRHEA, UNSPECIFIED TYPE: ICD-10-CM

## 2020-01-24 RX ORDER — MONTELUKAST SODIUM 4 MG/1
1 TABLET, CHEWABLE ORAL 2 TIMES DAILY
Qty: 180 TABLET | Refills: 2 | Status: SHIPPED | OUTPATIENT
Start: 2020-01-24

## 2020-01-24 RX ORDER — MONTELUKAST SODIUM 4 MG/1
1 TABLET, CHEWABLE ORAL 2 TIMES DAILY
Qty: 180 TABLET | Refills: 2 | Status: SHIPPED | OUTPATIENT
Start: 2020-01-24 | End: 2020-01-24 | Stop reason: SDUPTHER

## 2020-01-24 RX ORDER — FLUCONAZOLE 150 MG/1
TABLET ORAL
Qty: 2 TABLET | Refills: 0 | Status: ON HOLD | OUTPATIENT
Start: 2020-01-24 | End: 2022-06-16

## 2020-01-24 NOTE — TELEPHONE ENCOUNTER
Pt called in for refill colestipol (COLESTID) 1 g tablet, it was called into the wrong pharmacy. Verified pharmacy  [facility] in [facility].       [phone]

## 2020-03-07 DIAGNOSIS — J30.89 ENVIRONMENTAL AND SEASONAL ALLERGIES: ICD-10-CM

## 2020-03-09 RX ORDER — FLUTICASONE PROPIONATE 50 MCG
SPRAY, SUSPENSION (ML) NASAL
Qty: 16 G | Refills: 0 | OUTPATIENT
Start: 2020-03-09

## 2021-05-14 ENCOUNTER — TELEPHONE ENCOUNTER (OUTPATIENT)
Dept: URBAN - METROPOLITAN AREA CLINIC 92 | Facility: CLINIC | Age: 66
End: 2021-05-14

## 2021-05-14 ENCOUNTER — TELEPHONE ENCOUNTER (OUTPATIENT)
Dept: URBAN - METROPOLITAN AREA CLINIC 23 | Facility: CLINIC | Age: 66
End: 2021-05-14

## 2021-05-14 ENCOUNTER — OFFICE VISIT (OUTPATIENT)
Dept: URBAN - METROPOLITAN AREA CLINIC 126 | Facility: CLINIC | Age: 66
End: 2021-05-14
Payer: MEDICARE

## 2021-05-14 ENCOUNTER — WEB ENCOUNTER (OUTPATIENT)
Dept: URBAN - METROPOLITAN AREA CLINIC 126 | Facility: CLINIC | Age: 66
End: 2021-05-14

## 2021-05-14 DIAGNOSIS — A04.72 C. DIFFICILE COLITIS: ICD-10-CM

## 2021-05-14 DIAGNOSIS — K14.0 GLOSSITIS: ICD-10-CM

## 2021-05-14 PROCEDURE — 99203 OFFICE O/P NEW LOW 30 MIN: CPT | Performed by: INTERNAL MEDICINE

## 2021-05-14 NOTE — HPI-TODAY'S VISIT:
went to er   with nausea and vomiting  and diarrhea  on vanco  all better   [place] then daughter moved to [facility]   feels good now   has mouth ulceers   prob from vanco  gets from meds

## 2021-08-13 ENCOUNTER — OFFICE VISIT (OUTPATIENT)
Dept: URBAN - METROPOLITAN AREA CLINIC 126 | Facility: CLINIC | Age: 66
End: 2021-08-13

## 2022-06-15 LAB
ALBUMIN SERPL-MCNC: 3.7 G/DL (ref 3.5–5.2)
ALBUMIN/GLOB SERPL: 1.2 G/DL
ALP SERPL-CCNC: 130 U/L (ref 39–117)
ALT SERPL W P-5'-P-CCNC: 16 U/L (ref 1–33)
ANION GAP SERPL CALCULATED.3IONS-SCNC: 10 MMOL/L (ref 5–15)
AST SERPL-CCNC: 18 U/L (ref 1–32)
BASOPHILS # BLD AUTO: 0.02 10*3/MM3 (ref 0–0.2)
BASOPHILS NFR BLD AUTO: 0.2 % (ref 0–1.5)
BILIRUB SERPL-MCNC: 0.4 MG/DL (ref 0–1.2)
BUN SERPL-MCNC: 13 MG/DL (ref 8–23)
BUN/CREAT SERPL: 15.1 (ref 7–25)
CALCIUM SPEC-SCNC: 9.1 MG/DL (ref 8.6–10.5)
CHLORIDE SERPL-SCNC: 104 MMOL/L (ref 98–107)
CO2 SERPL-SCNC: 24 MMOL/L (ref 22–29)
CREAT SERPL-MCNC: 0.86 MG/DL (ref 0.57–1)
D-LACTATE SERPL-SCNC: 1.9 MMOL/L (ref 0.5–2)
DEPRECATED RDW RBC AUTO: 45.9 FL (ref 37–54)
EGFRCR SERPLBLD CKD-EPI 2021: 74.2 ML/MIN/1.73
EOSINOPHIL # BLD AUTO: 0.04 10*3/MM3 (ref 0–0.4)
EOSINOPHIL NFR BLD AUTO: 0.4 % (ref 0.3–6.2)
ERYTHROCYTE [DISTWIDTH] IN BLOOD BY AUTOMATED COUNT: 14.9 % (ref 12.3–15.4)
GLOBULIN UR ELPH-MCNC: 3.2 GM/DL
GLUCOSE SERPL-MCNC: 103 MG/DL (ref 65–99)
HCT VFR BLD AUTO: 36 % (ref 34–46.6)
HGB BLD-MCNC: 11.4 G/DL (ref 12–15.9)
HOLD SPECIMEN: NORMAL
HOLD SPECIMEN: NORMAL
IMM GRANULOCYTES # BLD AUTO: 0.04 10*3/MM3 (ref 0–0.05)
IMM GRANULOCYTES NFR BLD AUTO: 0.4 % (ref 0–0.5)
LIPASE SERPL-CCNC: 16 U/L (ref 13–60)
LYMPHOCYTES # BLD AUTO: 2.48 10*3/MM3 (ref 0.7–3.1)
LYMPHOCYTES NFR BLD AUTO: 22.2 % (ref 19.6–45.3)
MCH RBC QN AUTO: 26.9 PG (ref 26.6–33)
MCHC RBC AUTO-ENTMCNC: 31.7 G/DL (ref 31.5–35.7)
MCV RBC AUTO: 84.9 FL (ref 79–97)
MONOCYTES # BLD AUTO: 0.88 10*3/MM3 (ref 0.1–0.9)
MONOCYTES NFR BLD AUTO: 7.9 % (ref 5–12)
NEUTROPHILS NFR BLD AUTO: 68.9 % (ref 42.7–76)
NEUTROPHILS NFR BLD AUTO: 7.72 10*3/MM3 (ref 1.7–7)
NRBC BLD AUTO-RTO: 0 /100 WBC (ref 0–0.2)
PLATELET # BLD AUTO: 370 10*3/MM3 (ref 140–450)
PMV BLD AUTO: 10.5 FL (ref 6–12)
POTASSIUM SERPL-SCNC: 3.5 MMOL/L (ref 3.5–5.2)
PROT SERPL-MCNC: 6.9 G/DL (ref 6–8.5)
RBC # BLD AUTO: 4.24 10*6/MM3 (ref 3.77–5.28)
SODIUM SERPL-SCNC: 138 MMOL/L (ref 136–145)
WBC NRBC COR # BLD: 11.18 10*3/MM3 (ref 3.4–10.8)
WHOLE BLOOD HOLD COAG: NORMAL
WHOLE BLOOD HOLD SPECIMEN: NORMAL

## 2022-06-15 PROCEDURE — 83690 ASSAY OF LIPASE: CPT

## 2022-06-15 PROCEDURE — 85025 COMPLETE CBC W/AUTO DIFF WBC: CPT

## 2022-06-15 PROCEDURE — 84484 ASSAY OF TROPONIN QUANT: CPT | Performed by: STUDENT IN AN ORGANIZED HEALTH CARE EDUCATION/TRAINING PROGRAM

## 2022-06-15 PROCEDURE — 99284 EMERGENCY DEPT VISIT MOD MDM: CPT

## 2022-06-15 PROCEDURE — 83605 ASSAY OF LACTIC ACID: CPT

## 2022-06-15 PROCEDURE — 80053 COMPREHEN METABOLIC PANEL: CPT

## 2022-06-15 PROCEDURE — 83735 ASSAY OF MAGNESIUM: CPT | Performed by: STUDENT IN AN ORGANIZED HEALTH CARE EDUCATION/TRAINING PROGRAM

## 2022-06-15 RX ORDER — SODIUM CHLORIDE 9 MG/ML
10 INJECTION INTRAVENOUS AS NEEDED
Status: DISCONTINUED | OUTPATIENT
Start: 2022-06-15 | End: 2022-06-19 | Stop reason: HOSPADM

## 2022-06-16 ENCOUNTER — HOSPITAL ENCOUNTER (OUTPATIENT)
Facility: HOSPITAL | Age: 67
Setting detail: OBSERVATION
LOS: 1 days | Discharge: HOME | End: 2022-06-19
Attending: STUDENT IN AN ORGANIZED HEALTH CARE EDUCATION/TRAINING PROGRAM | Admitting: INTERNAL MEDICINE

## 2022-06-16 ENCOUNTER — APPOINTMENT (OUTPATIENT)
Dept: CT IMAGING | Facility: HOSPITAL | Age: 67
End: 2022-06-16

## 2022-06-16 DIAGNOSIS — N12 PYELONEPHRITIS: Primary | ICD-10-CM

## 2022-06-16 DIAGNOSIS — R10.84 GENERALIZED ABDOMINAL PAIN: ICD-10-CM

## 2022-06-16 DIAGNOSIS — K52.9 ENTERITIS: ICD-10-CM

## 2022-06-16 DIAGNOSIS — A49.9 UTI (URINARY TRACT INFECTION), BACTERIAL: ICD-10-CM

## 2022-06-16 DIAGNOSIS — N39.0 UTI (URINARY TRACT INFECTION), BACTERIAL: ICD-10-CM

## 2022-06-16 PROBLEM — D72.829 LEUKOCYTOSIS: Status: ACTIVE | Noted: 2022-06-16

## 2022-06-16 LAB
ANION GAP SERPL CALCULATED.3IONS-SCNC: 7 MMOL/L (ref 5–15)
BACTERIA UR QL AUTO: ABNORMAL /HPF
BILIRUB UR QL STRIP: NEGATIVE
BUN SERPL-MCNC: 14 MG/DL (ref 8–23)
BUN/CREAT SERPL: 17.5 (ref 7–25)
CALCIUM SPEC-SCNC: 8.8 MG/DL (ref 8.6–10.5)
CHLORIDE SERPL-SCNC: 105 MMOL/L (ref 98–107)
CLARITY UR: ABNORMAL
CO2 SERPL-SCNC: 25 MMOL/L (ref 22–29)
COLOR UR: YELLOW
CREAT SERPL-MCNC: 0.8 MG/DL (ref 0.57–1)
D-LACTATE SERPL-SCNC: 1.4 MMOL/L (ref 0.5–2)
DEPRECATED RDW RBC AUTO: 45.5 FL (ref 37–54)
EGFRCR SERPLBLD CKD-EPI 2021: 80.9 ML/MIN/1.73
ERYTHROCYTE [DISTWIDTH] IN BLOOD BY AUTOMATED COUNT: 15 % (ref 12.3–15.4)
FLUAV RNA RESP QL NAA+PROBE: NOT DETECTED
FLUBV RNA RESP QL NAA+PROBE: NOT DETECTED
GLUCOSE SERPL-MCNC: 100 MG/DL (ref 65–99)
GLUCOSE UR STRIP-MCNC: NEGATIVE MG/DL
GRAN CASTS URNS QL MICRO: ABNORMAL /LPF
HCT VFR BLD AUTO: 32.6 % (ref 34–46.6)
HGB BLD-MCNC: 10.4 G/DL (ref 12–15.9)
HGB UR QL STRIP.AUTO: NEGATIVE
HOLD SPECIMEN: NORMAL
HYALINE CASTS UR QL AUTO: ABNORMAL /LPF
KETONES UR QL STRIP: ABNORMAL
LEUKOCYTE ESTERASE UR QL STRIP.AUTO: ABNORMAL
MAGNESIUM SERPL-MCNC: 1.9 MG/DL (ref 1.6–2.4)
MCH RBC QN AUTO: 26.7 PG (ref 26.6–33)
MCHC RBC AUTO-ENTMCNC: 31.9 G/DL (ref 31.5–35.7)
MCV RBC AUTO: 83.6 FL (ref 79–97)
NITRITE UR QL STRIP: POSITIVE
PH UR STRIP.AUTO: 6 [PH] (ref 5–8)
PLATELET # BLD AUTO: 313 10*3/MM3 (ref 140–450)
PMV BLD AUTO: 10.8 FL (ref 6–12)
POTASSIUM SERPL-SCNC: 3.3 MMOL/L (ref 3.5–5.2)
PROT UR QL STRIP: ABNORMAL
RBC # BLD AUTO: 3.9 10*6/MM3 (ref 3.77–5.28)
RBC # UR STRIP: ABNORMAL /HPF
REF LAB TEST METHOD: ABNORMAL
RSV RNA NPH QL NAA+NON-PROBE: NOT DETECTED
SARS-COV-2 RNA RESP QL NAA+PROBE: NOT DETECTED
SODIUM SERPL-SCNC: 137 MMOL/L (ref 136–145)
SP GR UR STRIP: 1.06 (ref 1–1.03)
SQUAMOUS #/AREA URNS HPF: ABNORMAL /HPF
TROPONIN T SERPL-MCNC: <0.01 NG/ML (ref 0–0.03)
UROBILINOGEN UR QL STRIP: ABNORMAL
WBC # UR STRIP: ABNORMAL /HPF
WBC NRBC COR # BLD: 9.15 10*3/MM3 (ref 3.4–10.8)

## 2022-06-16 PROCEDURE — 87077 CULTURE AEROBIC IDENTIFY: CPT | Performed by: INTERNAL MEDICINE

## 2022-06-16 PROCEDURE — 86037 ANCA TITER EACH ANTIBODY: CPT

## 2022-06-16 PROCEDURE — 83605 ASSAY OF LACTIC ACID: CPT | Performed by: STUDENT IN AN ORGANIZED HEALTH CARE EDUCATION/TRAINING PROGRAM

## 2022-06-16 PROCEDURE — 80048 BASIC METABOLIC PNL TOTAL CA: CPT

## 2022-06-16 PROCEDURE — 96375 TX/PRO/DX INJ NEW DRUG ADDON: CPT

## 2022-06-16 PROCEDURE — 25010000002 IOPAMIDOL 61 % SOLUTION: Performed by: STUDENT IN AN ORGANIZED HEALTH CARE EDUCATION/TRAINING PROGRAM

## 2022-06-16 PROCEDURE — 87186 SC STD MICRODIL/AGAR DIL: CPT | Performed by: INTERNAL MEDICINE

## 2022-06-16 PROCEDURE — 74177 CT ABD & PELVIS W/CONTRAST: CPT

## 2022-06-16 PROCEDURE — 25010000002 DIPHENHYDRAMINE PER 50 MG: Performed by: STUDENT IN AN ORGANIZED HEALTH CARE EDUCATION/TRAINING PROGRAM

## 2022-06-16 PROCEDURE — 81001 URINALYSIS AUTO W/SCOPE: CPT | Performed by: STUDENT IN AN ORGANIZED HEALTH CARE EDUCATION/TRAINING PROGRAM

## 2022-06-16 PROCEDURE — 87040 BLOOD CULTURE FOR BACTERIA: CPT | Performed by: STUDENT IN AN ORGANIZED HEALTH CARE EDUCATION/TRAINING PROGRAM

## 2022-06-16 PROCEDURE — 87086 URINE CULTURE/COLONY COUNT: CPT | Performed by: INTERNAL MEDICINE

## 2022-06-16 PROCEDURE — 25010000002 ONDANSETRON PER 1 MG: Performed by: STUDENT IN AN ORGANIZED HEALTH CARE EDUCATION/TRAINING PROGRAM

## 2022-06-16 PROCEDURE — 85027 COMPLETE CBC AUTOMATED: CPT

## 2022-06-16 PROCEDURE — 99220 PR INITIAL OBSERVATION CARE/DAY 70 MINUTES: CPT

## 2022-06-16 PROCEDURE — 25010000002 KETOROLAC TROMETHAMINE PER 15 MG: Performed by: STUDENT IN AN ORGANIZED HEALTH CARE EDUCATION/TRAINING PROGRAM

## 2022-06-16 PROCEDURE — 96374 THER/PROPH/DIAG INJ IV PUSH: CPT

## 2022-06-16 PROCEDURE — 25010000002 HYDROMORPHONE PER 4 MG: Performed by: STUDENT IN AN ORGANIZED HEALTH CARE EDUCATION/TRAINING PROGRAM

## 2022-06-16 PROCEDURE — 86671 FUNGUS NES ANTIBODY: CPT

## 2022-06-16 PROCEDURE — G0378 HOSPITAL OBSERVATION PER HR: HCPCS

## 2022-06-16 PROCEDURE — 87637 SARSCOV2&INF A&B&RSV AMP PRB: CPT | Performed by: STUDENT IN AN ORGANIZED HEALTH CARE EDUCATION/TRAINING PROGRAM

## 2022-06-16 PROCEDURE — 25010000002 CEFTRIAXONE PER 250 MG: Performed by: STUDENT IN AN ORGANIZED HEALTH CARE EDUCATION/TRAINING PROGRAM

## 2022-06-16 RX ORDER — MAGNESIUM SULFATE HEPTAHYDRATE 40 MG/ML
4 INJECTION, SOLUTION INTRAVENOUS AS NEEDED
Status: DISCONTINUED | OUTPATIENT
Start: 2022-06-16 | End: 2022-06-19 | Stop reason: HOSPADM

## 2022-06-16 RX ORDER — POTASSIUM CHLORIDE 1.5 G/1.77G
40 POWDER, FOR SOLUTION ORAL AS NEEDED
Status: DISCONTINUED | OUTPATIENT
Start: 2022-06-16 | End: 2022-06-19 | Stop reason: HOSPADM

## 2022-06-16 RX ORDER — ACETAMINOPHEN 160 MG/5ML
650 SOLUTION ORAL EVERY 4 HOURS PRN
Status: DISCONTINUED | OUTPATIENT
Start: 2022-06-16 | End: 2022-06-19 | Stop reason: HOSPADM

## 2022-06-16 RX ORDER — POTASSIUM CHLORIDE 7.45 MG/ML
10 INJECTION INTRAVENOUS
Status: DISCONTINUED | OUTPATIENT
Start: 2022-06-16 | End: 2022-06-19 | Stop reason: HOSPADM

## 2022-06-16 RX ORDER — SODIUM CHLORIDE 0.9 % (FLUSH) 0.9 %
10 SYRINGE (ML) INJECTION EVERY 12 HOURS SCHEDULED
Status: DISCONTINUED | OUTPATIENT
Start: 2022-06-16 | End: 2022-06-19 | Stop reason: HOSPADM

## 2022-06-16 RX ORDER — POTASSIUM CHLORIDE 750 MG/1
40 CAPSULE, EXTENDED RELEASE ORAL AS NEEDED
Status: DISCONTINUED | OUTPATIENT
Start: 2022-06-16 | End: 2022-06-19 | Stop reason: HOSPADM

## 2022-06-16 RX ORDER — ONDANSETRON 2 MG/ML
4 INJECTION INTRAMUSCULAR; INTRAVENOUS ONCE
Status: COMPLETED | OUTPATIENT
Start: 2022-06-16 | End: 2022-06-16

## 2022-06-16 RX ORDER — FERROUS SULFATE 325(65) MG
325 TABLET ORAL
Status: DISCONTINUED | OUTPATIENT
Start: 2022-06-16 | End: 2022-06-19 | Stop reason: HOSPADM

## 2022-06-16 RX ORDER — SODIUM CHLORIDE, SODIUM LACTATE, POTASSIUM CHLORIDE, CALCIUM CHLORIDE 600; 310; 30; 20 MG/100ML; MG/100ML; MG/100ML; MG/100ML
75 INJECTION, SOLUTION INTRAVENOUS CONTINUOUS
Status: ACTIVE | OUTPATIENT
Start: 2022-06-16 | End: 2022-06-16

## 2022-06-16 RX ORDER — LOSARTAN POTASSIUM 50 MG/1
50 TABLET ORAL
Status: DISCONTINUED | OUTPATIENT
Start: 2022-06-16 | End: 2022-06-19 | Stop reason: HOSPADM

## 2022-06-16 RX ORDER — CLOBETASOL PROPIONATE 0.5 MG/G
CREAM TOPICAL EVERY 12 HOURS SCHEDULED
Status: DISCONTINUED | OUTPATIENT
Start: 2022-06-16 | End: 2022-06-19 | Stop reason: HOSPADM

## 2022-06-16 RX ORDER — ACETAMINOPHEN 650 MG/1
650 SUPPOSITORY RECTAL EVERY 4 HOURS PRN
Status: DISCONTINUED | OUTPATIENT
Start: 2022-06-16 | End: 2022-06-19 | Stop reason: HOSPADM

## 2022-06-16 RX ORDER — CETIRIZINE HYDROCHLORIDE 5 MG/1
5 TABLET ORAL DAILY
COMMUNITY

## 2022-06-16 RX ORDER — HYDROMORPHONE HYDROCHLORIDE 1 MG/ML
0.5 INJECTION, SOLUTION INTRAMUSCULAR; INTRAVENOUS; SUBCUTANEOUS
Status: DISCONTINUED | OUTPATIENT
Start: 2022-06-16 | End: 2022-06-19 | Stop reason: HOSPADM

## 2022-06-16 RX ORDER — KETOROLAC TROMETHAMINE 15 MG/ML
15 INJECTION, SOLUTION INTRAMUSCULAR; INTRAVENOUS ONCE
Status: COMPLETED | OUTPATIENT
Start: 2022-06-16 | End: 2022-06-16

## 2022-06-16 RX ORDER — DIPHENHYDRAMINE HYDROCHLORIDE 50 MG/ML
25 INJECTION INTRAMUSCULAR; INTRAVENOUS ONCE
Status: COMPLETED | OUTPATIENT
Start: 2022-06-16 | End: 2022-06-16

## 2022-06-16 RX ORDER — MAGNESIUM SULFATE HEPTAHYDRATE 40 MG/ML
2 INJECTION, SOLUTION INTRAVENOUS AS NEEDED
Status: DISCONTINUED | OUTPATIENT
Start: 2022-06-16 | End: 2022-06-19 | Stop reason: HOSPADM

## 2022-06-16 RX ORDER — AMLODIPINE BESYLATE 5 MG/1
5 TABLET ORAL DAILY
Status: DISCONTINUED | OUTPATIENT
Start: 2022-06-16 | End: 2022-06-19 | Stop reason: HOSPADM

## 2022-06-16 RX ORDER — ASPIRIN 81 MG/1
81 TABLET, CHEWABLE ORAL DAILY
Status: DISCONTINUED | OUTPATIENT
Start: 2022-06-16 | End: 2022-06-19 | Stop reason: HOSPADM

## 2022-06-16 RX ORDER — CHOLESTYRAMINE LIGHT 4 G/5.7G
1 POWDER, FOR SUSPENSION ORAL DAILY
Status: DISCONTINUED | OUTPATIENT
Start: 2022-06-16 | End: 2022-06-19 | Stop reason: HOSPADM

## 2022-06-16 RX ORDER — HYDROMORPHONE HYDROCHLORIDE 1 MG/ML
0.25 INJECTION, SOLUTION INTRAMUSCULAR; INTRAVENOUS; SUBCUTANEOUS ONCE
Status: COMPLETED | OUTPATIENT
Start: 2022-06-16 | End: 2022-06-16

## 2022-06-16 RX ORDER — LEVOTHYROXINE SODIUM 0.12 MG/1
125 TABLET ORAL
Status: DISCONTINUED | OUTPATIENT
Start: 2022-06-16 | End: 2022-06-19 | Stop reason: HOSPADM

## 2022-06-16 RX ORDER — ACETAMINOPHEN 325 MG/1
650 TABLET ORAL EVERY 4 HOURS PRN
Status: DISCONTINUED | OUTPATIENT
Start: 2022-06-16 | End: 2022-06-19 | Stop reason: HOSPADM

## 2022-06-16 RX ORDER — FLUTICASONE PROPIONATE 50 MCG
2 SPRAY, SUSPENSION (ML) NASAL DAILY
Status: DISCONTINUED | OUTPATIENT
Start: 2022-06-16 | End: 2022-06-19 | Stop reason: HOSPADM

## 2022-06-16 RX ORDER — SODIUM CHLORIDE 0.9 % (FLUSH) 0.9 %
10 SYRINGE (ML) INJECTION AS NEEDED
Status: DISCONTINUED | OUTPATIENT
Start: 2022-06-16 | End: 2022-06-19 | Stop reason: HOSPADM

## 2022-06-16 RX ORDER — CETIRIZINE HYDROCHLORIDE, PSEUDOEPHEDRINE HYDROCHLORIDE 5; 120 MG/1; MG/1
1 TABLET, FILM COATED, EXTENDED RELEASE ORAL 2 TIMES DAILY
Status: DISCONTINUED | OUTPATIENT
Start: 2022-06-16 | End: 2022-06-19 | Stop reason: HOSPADM

## 2022-06-16 RX ADMIN — DIPHENHYDRAMINE HYDROCHLORIDE 25 MG: 50 INJECTION, SOLUTION INTRAMUSCULAR; INTRAVENOUS at 04:24

## 2022-06-16 RX ADMIN — FLUTICASONE PROPIONATE 2 SPRAY: 50 SPRAY, METERED NASAL at 10:10

## 2022-06-16 RX ADMIN — Medication 10 ML: at 21:37

## 2022-06-16 RX ADMIN — SODIUM CHLORIDE 1 G: 9 INJECTION, SOLUTION INTRAVENOUS at 04:24

## 2022-06-16 RX ADMIN — SERTRALINE HYDROCHLORIDE 50 MG: 50 TABLET ORAL at 10:08

## 2022-06-16 RX ADMIN — KETOROLAC TROMETHAMINE 15 MG: 15 INJECTION, SOLUTION INTRAMUSCULAR; INTRAVENOUS at 00:38

## 2022-06-16 RX ADMIN — CLOBETASOL PROPIONATE: 0.5 CREAM TOPICAL at 10:10

## 2022-06-16 RX ADMIN — Medication 10 ML: at 10:11

## 2022-06-16 RX ADMIN — CETIRIZINE HYDROCHLORIDE, PSEUDOEPHEDRINE HYDROCHLORIDE 1 TABLET: 5; 120 TABLET, FILM COATED, EXTENDED RELEASE ORAL at 10:09

## 2022-06-16 RX ADMIN — POTASSIUM CHLORIDE 40 MEQ: 750 CAPSULE, EXTENDED RELEASE ORAL at 10:09

## 2022-06-16 RX ADMIN — ASPIRIN 81 MG CHEWABLE TABLET 81 MG: 81 TABLET CHEWABLE at 10:09

## 2022-06-16 RX ADMIN — FERROUS SULFATE TAB 325 MG (65 MG ELEMENTAL FE) 325 MG: 325 (65 FE) TAB at 10:09

## 2022-06-16 RX ADMIN — POTASSIUM CHLORIDE 40 MEQ: 750 CAPSULE, EXTENDED RELEASE ORAL at 17:28

## 2022-06-16 RX ADMIN — LOSARTAN POTASSIUM 50 MG: 50 TABLET, FILM COATED ORAL at 10:08

## 2022-06-16 RX ADMIN — ONDANSETRON 4 MG: 2 INJECTION INTRAMUSCULAR; INTRAVENOUS at 04:25

## 2022-06-16 RX ADMIN — HYDROMORPHONE HYDROCHLORIDE 0.25 MG: 1 INJECTION, SOLUTION INTRAMUSCULAR; INTRAVENOUS; SUBCUTANEOUS at 04:23

## 2022-06-16 RX ADMIN — SODIUM CHLORIDE, POTASSIUM CHLORIDE, SODIUM LACTATE AND CALCIUM CHLORIDE 75 ML/HR: 600; 310; 30; 20 INJECTION, SOLUTION INTRAVENOUS at 05:36

## 2022-06-16 RX ADMIN — LEVOTHYROXINE SODIUM 125 MCG: 125 TABLET ORAL at 07:23

## 2022-06-16 RX ADMIN — CETIRIZINE HYDROCHLORIDE, PSEUDOEPHEDRINE HYDROCHLORIDE 1 TABLET: 5; 120 TABLET, FILM COATED, EXTENDED RELEASE ORAL at 21:37

## 2022-06-16 RX ADMIN — CHOLESTYRAMINE 4 G: 4 POWDER, FOR SUSPENSION ORAL at 10:09

## 2022-06-16 RX ADMIN — ONDANSETRON 4 MG: 2 INJECTION INTRAMUSCULAR; INTRAVENOUS at 00:38

## 2022-06-16 RX ADMIN — AMLODIPINE BESYLATE 5 MG: 5 TABLET ORAL at 10:09

## 2022-06-16 RX ADMIN — IOPAMIDOL 100 ML: 612 INJECTION, SOLUTION INTRAVENOUS at 01:12

## 2022-06-16 RX ADMIN — SODIUM CHLORIDE, POTASSIUM CHLORIDE, SODIUM LACTATE AND CALCIUM CHLORIDE 1000 ML: 600; 310; 30; 20 INJECTION, SOLUTION INTRAVENOUS at 00:36

## 2022-06-16 NOTE — PROGRESS NOTES
[facility] Medicine Services  PROGRESS NOTE    Patient Name: Shireen Davenport  : 1955  MRN: [id number]    Date of Admission: 2022  Primary Care Physician: Provider, No Known    Subjective   Subjective     CC:  N/V    HPI:  Resting in a chair in no acute distress and tells me that she feels better.  She still have some mild left flank discomfort.  No fever or chills.  No chest pain or palpitation shortness of breath at rest.  No nausea vomiting or diarrhea.    ROS:  As above    Objective   Objective     Vital Signs:   Temp:  [97.9 °F (36.6 °C)-99.4 °F (37.4 °C)] 98.1 °F (36.7 °C)  Heart Rate:  [56-75] 62  Resp:  [16-18] 18  BP: (125-146)/(64-87) 132/87     Physical Exam:  Constitutional: No acute distress, looks comfortable  HENT: NCAT, mucous membranes moist  Respiratory: Clear to auscultation bilaterally, respiratory effort normal   Cardiovascular: RRR, no murmurs, rubs, or gallops  Gastrointestinal: Abdomen is obese.  Positive bowel sounds, soft, nontender, nondistended  Musculoskeletal:  bilateral ankle edema, severe obesity  Psychiatric: Appropriate affect, cooperative  Neurologic: Awake, alert, oriented x3, no focality appreciated, speech clear  Skin: No rashes    Results Reviewed:  LAB RESULTS:      Lab 22  0611 222 06/15/22  2043   WBC 9.15  --  11.18*   HEMOGLOBIN 10.4*  --  11.4*   HEMATOCRIT 32.6*  --  36.0   PLATELETS 313  --  370   NEUTROS ABS  --   --  7.72*   IMMATURE GRANS (ABS)  --   --  0.04   LYMPHS ABS  --   --  2.48   MONOS ABS  --   --  0.88   EOS ABS  --   --  0.04   MCV 83.6  --  84.9   LACTATE  --  1.4 1.9         Lab 22  0611 06/15/22  2043   SODIUM 137 138   POTASSIUM 3.3* 3.5   CHLORIDE 105 104   CO2 25.0 24.0   ANION GAP 7.0 10.0   BUN 14 13   CREATININE 0.80 0.86   EGFR 80.9 74.2   GLUCOSE 100* 103*   CALCIUM 8.8 9.1   MAGNESIUM  --  1.9         Lab 06/15/22  2043   TOTAL PROTEIN 6.9   ALBUMIN 3.70   GLOBULIN 3.2   ALT (SGPT)  16   AST (SGOT) 18   BILIRUBIN 0.4   ALK PHOS 130*   LIPASE 16         Lab 06/15/22  2043   TROPONIN T <0.010                 Brief Urine Lab Results  (Last result in the past 365 days)      Color   Clarity   Blood   Leuk Est   Nitrite   Protein   CREAT   Urine HCG        06/16/22 0251 Yellow   Turbid   Negative   Moderate (2+)   Positive   30 mg/dL (1+)                 Microbiology Results Abnormal     Procedure Component Value - Date/Time    COVID PRE-OP / PRE-PROCEDURE SCREENING ORDER (NO ISOLATION) - Swab, Nasopharynx [[id number]]  (Normal) Collected: 06/16/22 0045    Lab Status: Final result Specimen: Swab from Nasopharynx Updated: 06/16/22 0139    Narrative:      The following orders were created for panel order COVID PRE-OP / PRE-PROCEDURE SCREENING ORDER (NO ISOLATION) - Swab, Nasopharynx.  Procedure                               Abnormality         Status                     ---------                               -----------         ------                     COVID-19, FLU A/B, RSV P...[[id number]]  Normal              Final result                 Please view results for these tests on the individual orders.    COVID-19, FLU A/B, RSV PCR - Swab, Nasopharynx [[id number]]  (Normal) Collected: 06/16/22 0045    Lab Status: Final result Specimen: Swab from Nasopharynx Updated: 06/16/22 0139     COVID19 Not Detected     Influenza A PCR Not Detected     Influenza B PCR Not Detected     RSV, PCR Not Detected    Narrative:      Fact sheet for providers: https://www.fda.gov/media/532779/download    Fact sheet for patients: https://www.fda.gov/media/456042/download    Test performed by PCR.          CT Abdomen Pelvis With Contrast    Result Date: 6/16/2022  EXAMINATION: CT SCAN OF THE ABDOMEN AND PELVIS WITH INTRAVENOUS CONTRAST DATE OF EXAM: 6/16/2022 1:02 AM HISTORY: Severe lower abdominal pain. Nausea. Vomiting. Diarrhea. Appendectomy. Cholecystectomy. COMPARISON: None. TECHNIQUE: CT examination of the abdomen and  pelvis was performed following the intravenous administration of 100 mL of Isovue-300. CT dose lowering techniques were used, to include: automated exposure control, adjustment for patient size, and/or use of iterative reconstruction. FINDINGS: ABDOMEN/PELVIS: Lower Chest: Small hiatal hernia. Liver: Normal. Gallbladder/Billary: Cholecystectomy. Pancreas: Normal. Spleen: Calcified granuloma. Adrenal Glands: Normal. Kidneys: Moderate scarring of the left kidney. Mildly heterogenous enhancement of the left kidney. Scattered renal cysts in the kidneys. GI Tract: The stomach and duodenum are unremarkable. Moderate diverticulosis of the sigmoid colon. Fluid in the lumen of the colon compatible with diarrhea. Mild thickening of the walls of a segment of the ileum is seen (900/84-68). Mesentery/Peritoneum: Normal. Vasculature: Advanced atherosclerosis of the abdominal aorta. Lymph Nodes: Normal. Abdominal Wall: Tiny indirect right inguinal hernia containing only fat. Bladder: Normal. Reproductive: Hysterectomy. Unremarkable adnexa. Musculoskeletal: Normal.     Impression: 1. Findings compatible with mild pyelonephritis involving the left kidney. Moderate scarring of the left kidney is compatible with prior pyelonephritis. 2. Findings compatible with mild enteritis involving the distal ileum. The central mesenteric vessels are patent. Infectious and inflammatory etiologies are favored. Electronically signed by:  Ortiz Sinclair M.D.  6/15/2022 11:36 PM Mountain Time          I have reviewed the medications:  Scheduled Meds:amLODIPine, 5 mg, Oral, Daily  aspirin, 81 mg, Oral, Daily  [START ON 6/17/2022] cefTRIAXone, 2 g, Intravenous, Q24H  cetirizine-pseudoephedrine, 1 tablet, Oral, BID  cholestyramine light, 1 packet, Oral, Daily  clobetasol, , Topical, Q12H  ferrous sulfate, 325 mg, Oral, Daily With Breakfast  fluticasone, 2 spray, Nasal, Daily  levothyroxine, 125 mcg, Oral, Q AM  losartan, 50 mg, Oral, Q24H  sertraline, 50  mg, Oral, Daily  sodium chloride, 10 mL, Intravenous, Q12H      Continuous Infusions:   PRN Meds:.•  acetaminophen **OR** acetaminophen **OR** acetaminophen  •  HYDROmorphone  •  magnesium sulfate **OR** magnesium sulfate **OR** magnesium sulfate  •  potassium chloride **OR** potassium chloride **OR** potassium chloride  •  Sodium Chloride (PF)  •  sodium chloride    Assessment & Plan   Assessment & Plan     Active Hospital Problems    Diagnosis  POA   • Pyelonephritis [N12]  Yes   • Leukocytosis [D72.829]  Yes   • UTI (urinary tract infection), bacterial [N39.0, A49.9]  Yes   • Enteritis [K52.9]  Yes   • Diarrhea [R19.7]  Yes   • Hypothyroidism [E03.9]  Yes   • Hypertension [I10]  Yes      Resolved Hospital Problems   No resolved problems to display.        Brief Hospital Course to date:  Shireen Davenport is a 66 y.o. female with past medical history significant for hypertension, hypothyroidism, history of multiple kidney stones with recent stone extraction in [place].  Patient also has frequent UTIs.  Patient was admitted for nausea vomiting and diarrhea and work-up at the emergency room showed nitrite and leukocyte esterase in the urine.  CT scan also shows evidence of right pyelonephritis.  Patient was admitted and was started on IV antibiotics.    *Nausea vomiting and left costovertebral tenderness secondary to pyelonephritis.  Patient is currently on Rocephin.  Symptoms have improved significantly.    *History of kidney stones and recent history of stone extraction.  Patient also reports multiple UTIs.  Patient has a urologist in [place] whom she follows with.    *Diarrhea possibly secondary to viral enteritis.  Patient has improved since admission.    *Hypertension, currently controlled.    *Hypothyroidism on home dose Synthroid.    *Severe obesity    *Depression, on Zoloft.    DVT prophylaxis:  Mechanical DVT prophylaxis orders are present.       AM-PAC 6 Clicks Score (PT): 24 (06/16/22 0700)    Disposition:   Home in a few days..    CODE STATUS:   Code Status and Medical Interventions:   Ordered at: 06/16/22 0502     Level Of Support Discussed With:    Patient     Code Status (Patient has no pulse and is not breathing):    CPR (Attempt to Resuscitate)     Medical Interventions (Patient has pulse or is breathing):    Full Support       Rangel Bess MD  06/16/22

## 2022-06-16 NOTE — H&P
[facility] Medicine Services  Clinical Decision Unit (CDU)  History and Physical    Patient Name: Shireen Davenport  : 1955  MRN: [id number]  Primary Care Physician: Provider, No Known  Date of admission: 2022 12:18 AM      Subjective   Subjective     Chief Complaint:  Nausea/Vomiting    HPI:  Shireen Davenport is a 66 y.o. female with PMH of HTN, hypothyroidism, kidney stones, and pyelonephritis presented to the ED with complaints of suprapubic pain, nausea and vomiting that started yesterday. Patient arrived in [place] on Tuesday and originally lives in [place]. Patient noted change in her taste that progressed to nausea, green emesis, worsening diarrhea and abdominal pain. She states she has possible diagnosis of Crohns vs. IBS and follows with Dr. Little. Last colonoscopy was 3 years ago. Upon arrival to ED, CT abdomen shows left pyelonephritis. Patient denies fever, chest pain, shortness of breath, melena, or dysuria.      Review of Systems   Constitutional: Negative.  Negative for chills, diaphoresis, fatigue and fever.   HENT: Negative.    Eyes: Negative.    Respiratory: Negative for cough, chest tightness and shortness of breath.    Cardiovascular: Negative.    Gastrointestinal: Positive for abdominal pain, diarrhea, nausea and vomiting.   Genitourinary: Positive for pelvic pain. Negative for dysuria and hematuria.   Musculoskeletal: Negative.    Skin: Negative.    Neurological: Negative.    Psychiatric/Behavioral: Negative.         All other systems reviewed and negative    Personal History     Past Medical History:   Diagnosis Date    Anemia     Chronic diarrhea     Depression     Gall stone     History of nephrolithiasis     Hypertension     Hypothyroidism     Kidney stone     Menopause     Ovarian cyst     Thyroid disease     Vaginal atrophy              Past Surgical History:   Procedure Laterality Date    CHOLECYSTECTOMY      CYSTOSCOPY BLADDER STONE LITHOTRIPSY       HEMORRHOIDECTOMY      OOPHORECTOMY Bilateral 1992    VAGINAL HYSTERECTOMY SALPINGO OOPHORECTOMY Bilateral 1992       Family History:  family history includes Ovarian cancer (age of onset: 55) in her mother. Otherwise pertinent FHx was reviewed and unremarkable.     Social History:  reports that she has been smoking cigarettes. She has never used smokeless tobacco. She reports that she does not drink alcohol and does not use drugs.  Social History     Social History Narrative    Not on file       Medications:  amLODIPine, aspirin, cetirizine-pseudoephedrine, clobetasol, colestipol, conjugated estrogens, ferrous sulfate, fluconazole, fluticasone, irbesartan, levothyroxine, methenamine, sertraline, and spironolactone    Allergies   Allergen Reactions    Codeine GI Intolerance    Penicillins     Sulfa Antibiotics Itching       Objective   Objective     Vital Signs:   Temp:  [99.4 °F (37.4 °C)] 99.4 °F (37.4 °C)  Heart Rate:  [65-75] 65  Resp:  [16] 16  BP: (139-146)/(71-86) 146/71    Physical Exam  Constitutional:       General: She is not in acute distress.     Appearance: Normal appearance. She is obese.   HENT:      Head: Normocephalic and atraumatic.      Nose: Nose normal.      Mouth/Throat:      Mouth: Mucous membranes are moist.      Pharynx: Oropharynx is clear.   Eyes:      Extraocular Movements: Extraocular movements intact.      Pupils: Pupils are equal, round, and reactive to light.   Cardiovascular:      Rate and Rhythm: Normal rate and regular rhythm.      Heart sounds: No murmur heard.  Pulmonary:      Effort: Pulmonary effort is normal. No respiratory distress.      Breath sounds: Normal breath sounds. No rhonchi.   Abdominal:      General: Bowel sounds are normal. There is no distension.      Palpations: Abdomen is soft.      Tenderness: There is abdominal tenderness.   Musculoskeletal:         General: Normal range of motion.      Cervical back: Normal range of motion.   Skin:     General: Skin is  warm and dry.   Neurological:      General: No focal deficit present.      Mental Status: She is alert and oriented to person, place, and time.   Psychiatric:         Mood and Affect: Mood normal.         Behavior: Behavior normal.         Thought Content: Thought content normal.         Judgment: Judgment normal.          Results Reviewed:  I have personally reviewed and agree with findings, most notably:     LAB RESULTS:      Lab 06/15/22  2043   WBC 11.18*   HEMOGLOBIN 11.4*   HEMATOCRIT 36.0   PLATELETS 370   NEUTROS ABS 7.72*   IMMATURE GRANS (ABS) 0.04   LYMPHS ABS 2.48   MONOS ABS 0.88   EOS ABS 0.04   MCV 84.9   LACTATE 1.9         Lab 06/15/22  2043   SODIUM 138   POTASSIUM 3.5   CHLORIDE 104   CO2 24.0   ANION GAP 10.0   BUN 13   CREATININE 0.86   EGFR 74.2   GLUCOSE 103*   CALCIUM 9.1   MAGNESIUM 1.9         Lab 06/15/22  2043   TOTAL PROTEIN 6.9   ALBUMIN 3.70   GLOBULIN 3.2   ALT (SGPT) 16   AST (SGOT) 18   BILIRUBIN 0.4   ALK PHOS 130*   LIPASE 16         Lab 06/15/22  2043   TROPONIN T <0.010                 Brief Urine Lab Results  (Last result in the past 365 days)        Color   Clarity   Blood   Leuk Est   Nitrite   Protein   CREAT   Urine HCG        06/16/22 0251 Yellow   Turbid   Negative   Moderate (2+)   Positive   30 mg/dL (1+)                 Microbiology Results (last 10 days)       Procedure Component Value - Date/Time    COVID PRE-OP / PRE-PROCEDURE SCREENING ORDER (NO ISOLATION) - Swab, Nasopharynx [[id number]]  (Normal) Collected: 06/16/22 0045    Lab Status: Final result Specimen: Swab from Nasopharynx Updated: 06/16/22 0139    Narrative:      The following orders were created for panel order COVID PRE-OP / PRE-PROCEDURE SCREENING ORDER (NO ISOLATION) - Swab, Nasopharynx.  Procedure                               Abnormality         Status                     ---------                               -----------         ------                     COVID-19, FLU A/B, RSV P...[[id number]]   Normal              Final result                 Please view results for these tests on the individual orders.    COVID-19, FLU A/B, RSV PCR - Swab, Nasopharynx [[id number]]  (Normal) Collected: 06/16/22 0045    Lab Status: Final result Specimen: Swab from Nasopharynx Updated: 06/16/22 0139     COVID19 Not Detected     Influenza A PCR Not Detected     Influenza B PCR Not Detected     RSV, PCR Not Detected    Narrative:      Fact sheet for providers: https://www.fda.gov/media/631894/download    Fact sheet for patients: https://www.fda.gov/media/933636/download    Test performed by PCR.                   Assessment & Plan   Assessment / Plan     Active Hospital Problems    Diagnosis  POA    Pyelonephritis [N12]  Yes    Leukocytosis [D72.829]  Unknown    UTI (urinary tract infection), bacterial [N39.0, A49.9]  Unknown    Diarrhea [R19.7]  Yes    Hypothyroidism [E03.9]  Yes    Hypertension [I10]  Yes       Plan:    Pyelonephritis  UTI- POA  -UA with 4+ bacteria, positive nitrites  -CT abdomen shows mild pyelonephritis of left kidney and mild enteritis of distal ileum  -Start Rocephin  -Urine culture pending  -Fluids  -Pain control    Enteritis  Chronic Diarrhea  -CT shows mild enteritis of distal ileum  -pt reports questionable history of Crohn's vs IBS  -PCP for colonoscopy  -Consider inpatient vs outpatient GI follow up, has seen Dr Little in past    Leukocytosis  -Likely due to UTI pyelonephritis  -Continue rocephin     Hypothyroidism  -Continue home synthroid    HTN  -Continue home amlodipine as bp allows    Mood Disorder  -Continue home zoloft    CODE STATUS:  FULL         Discharge Blueprint (criteria for discharge):   Vital signs stable  Pain controlled    Electronically signed by ADRIANO Ramírez, 06/16/22, 4:58 AM EDT.     Seen and billed independently by [facility].

## 2022-06-16 NOTE — ED PROVIDER NOTES
EMERGENCY DEPARTMENT ENCOUNTER    Pt Name: Shireen Davenport  MRN: [id number]  Pt :   1955  Room Number:  S510/1  Date of encounter:  6/15/2022  PCP: Provider, No Known  ED Provider: Edwin Sanderson MD    Historian: Patient      HPI:  Chief Complaint: Abdominal pain, nausea vomiting diarrhea        Context: Shireen Davenport is a 66 y.o. female Patient presents because of pelvic pain, nausea, vomiting, diarrhea that started yesterday evening.  She says prior to that she was in her normal state of health but suddenly became very nauseous and started having lower abdominal pain.  The pain has persisted and is in the low abdomen/pelvic area.  She has not noticed any dysuria.  She denies fevers or systemic symptoms.  She has been having several episodes of diarrhea and says her vomit is green-colored she also complains of loss of taste and smell.  Currently rates her pain as severe.  Knows of nothing that makes the pain better.  No other complaints at this time.    PAST MEDICAL HISTORY  Past Medical History:   Diagnosis Date   • Anemia    • Chronic diarrhea    • Depression    • Gall stone    • History of nephrolithiasis    • Hypertension    • Hypothyroidism    • Kidney stone    • Menopause    • Ovarian cyst    • Thyroid disease    • Vaginal atrophy          PAST SURGICAL HISTORY  Past Surgical History:   Procedure Laterality Date   • CHOLECYSTECTOMY     • CYSTOSCOPY BLADDER STONE LITHOTRIPSY     • HEMORRHOIDECTOMY     • OOPHORECTOMY Bilateral    • VAGINAL HYSTERECTOMY SALPINGO OOPHORECTOMY Bilateral          FAMILY HISTORY  Family History   Problem Relation Age of Onset   • Ovarian cancer Mother 55   • Breast cancer Neg Hx          SOCIAL HISTORY  Social History     Socioeconomic History   • Marital status:    Tobacco Use   • Smoking status: Current Every Day Smoker     Types: Cigarettes   • Smokeless tobacco: Never Used   Substance and Sexual Activity   • Alcohol use: No   • Drug use: No   •  Sexual activity: Defer         ALLERGIES  Codeine, Penicillins, and Sulfa antibiotics        REVIEW OF SYSTEMS  Review of Systems       All systems reviewed and negative except for those discussed in HPI.       PHYSICAL EXAM    I have reviewed the triage vital signs and nursing notes.    ED Triage Vitals [06/15/22 2031]   Temp Heart Rate Resp BP SpO2   99.4 °F (37.4 °C) 75 16 144/86 95 %      Temp src Heart Rate Source Patient Position BP Location FiO2 (%)   Oral Monitor Sitting Left arm --       Physical Exam  GENERAL:   Appears ill  HENT: Nares patent.  Dry mucous membranes  EYES: No scleral icterus.  Pupils equal and reactive  CV: Regular rhythm, regular rate.  RESPIRATORY: Normal effort.  No audible wheezes, rales or rhonchi.  ABDOMEN: Distended, significant suprapubic tenderness to palpation with guarding, no rigidity  MUSCULOSKELETAL: No deformities.   NEURO: Alert, moves all extremities, follows commands.  SKIN: Warm, dry, no rash visualized.        LAB RESULTS  Recent Results (from the past 24 hour(s))   Comprehensive Metabolic Panel    Collection Time: 06/15/22  8:43 PM    Specimen: Blood   Result Value Ref Range    Glucose 103 (H) 65 - 99 mg/dL    BUN 13 8 - 23 mg/dL    Creatinine 0.86 0.57 - 1.00 mg/dL    Sodium 138 136 - 145 mmol/L    Potassium 3.5 3.5 - 5.2 mmol/L    Chloride 104 98 - 107 mmol/L    CO2 24.0 22.0 - 29.0 mmol/L    Calcium 9.1 8.6 - 10.5 mg/dL    Total Protein 6.9 6.0 - 8.5 g/dL    Albumin 3.70 3.50 - 5.20 g/dL    ALT (SGPT) 16 1 - 33 U/L    AST (SGOT) 18 1 - 32 U/L    Alkaline Phosphatase 130 (H) 39 - 117 U/L    Total Bilirubin 0.4 0.0 - 1.2 mg/dL    Globulin 3.2 gm/dL    A/G Ratio 1.2 g/dL    BUN/Creatinine Ratio 15.1 7.0 - 25.0    Anion Gap 10.0 5.0 - 15.0 mmol/L    eGFR 74.2 >60.0 mL/min/1.73   Lipase    Collection Time: 06/15/22  8:43 PM    Specimen: Blood   Result Value Ref Range    Lipase 16 13 - 60 U/L   Lactic Acid, Plasma    Collection Time: 06/15/22  8:43 PM    Specimen: Blood    Result Value Ref Range    Lactate 1.9 0.5 - 2.0 mmol/L   Green Top (Gel)    Collection Time: 06/15/22  8:43 PM   Result Value Ref Range    Extra Tube Hold for add-ons.    Lavender Top    Collection Time: 06/15/22  8:43 PM   Result Value Ref Range    Extra Tube hold for add-on    Gold Top - SST    Collection Time: 06/15/22  8:43 PM   Result Value Ref Range    Extra Tube Hold for add-ons.    Gray Top    Collection Time: 06/15/22  8:43 PM   Result Value Ref Range    Extra Tube Hold for add-ons.    Light Blue Top    Collection Time: 06/15/22  8:43 PM   Result Value Ref Range    Extra Tube Hold for add-ons.    CBC Auto Differential    Collection Time: 06/15/22  8:43 PM    Specimen: Blood   Result Value Ref Range    WBC 11.18 (H) 3.40 - 10.80 10*3/mm3    RBC 4.24 3.77 - 5.28 10*6/mm3    Hemoglobin 11.4 (L) 12.0 - 15.9 g/dL    Hematocrit 36.0 34.0 - 46.6 %    MCV 84.9 79.0 - 97.0 fL    MCH 26.9 26.6 - 33.0 pg    MCHC 31.7 31.5 - 35.7 g/dL    RDW 14.9 12.3 - 15.4 %    RDW-SD 45.9 37.0 - 54.0 fl    MPV 10.5 6.0 - 12.0 fL    Platelets 370 140 - 450 10*3/mm3    Neutrophil % 68.9 42.7 - 76.0 %    Lymphocyte % 22.2 19.6 - 45.3 %    Monocyte % 7.9 5.0 - 12.0 %    Eosinophil % 0.4 0.3 - 6.2 %    Basophil % 0.2 0.0 - 1.5 %    Immature Grans % 0.4 0.0 - 0.5 %    Neutrophils, Absolute 7.72 (H) 1.70 - 7.00 10*3/mm3    Lymphocytes, Absolute 2.48 0.70 - 3.10 10*3/mm3    Monocytes, Absolute 0.88 0.10 - 0.90 10*3/mm3    Eosinophils, Absolute 0.04 0.00 - 0.40 10*3/mm3    Basophils, Absolute 0.02 0.00 - 0.20 10*3/mm3    Immature Grans, Absolute 0.04 0.00 - 0.05 10*3/mm3    nRBC 0.0 0.0 - 0.2 /100 WBC   Troponin    Collection Time: 06/15/22  8:43 PM    Specimen: Blood   Result Value Ref Range    Troponin T <0.010 0.000 - 0.030 ng/mL   Magnesium    Collection Time: 06/15/22  8:43 PM    Specimen: Blood   Result Value Ref Range    Magnesium 1.9 1.6 - 2.4 mg/dL   COVID-19, FLU A/B, RSV PCR - Swab, Nasopharynx    Collection Time: 06/16/22 12:45  AM    Specimen: Nasopharynx; Swab   Result Value Ref Range    COVID19 Not Detected Not Detected - Ref. Range    Influenza A PCR Not Detected Not Detected    Influenza B PCR Not Detected Not Detected    RSV, PCR Not Detected Not Detected   Urinalysis With Microscopic If Indicated (No Culture) - Urine, Clean Catch    Collection Time: 06/16/22  2:51 AM    Specimen: Urine, Clean Catch   Result Value Ref Range    Color, UA Yellow Yellow, Straw    Appearance, UA Turbid (A) Clear    pH, UA 6.0 5.0 - 8.0    Specific Gravity, UA 1.060 (H) 1.001 - 1.030    Glucose, UA Negative Negative    Ketones, UA Trace (A) Negative    Bilirubin, UA Negative Negative    Blood, UA Negative Negative    Protein, UA 30 mg/dL (1+) (A) Negative    Leuk Esterase, UA Moderate (2+) (A) Negative    Nitrite, UA Positive (A) Negative    Urobilinogen, UA 0.2 E.U./dL 0.2 - 1.0 E.U./dL   Urinalysis, Microscopic Only - Urine, Clean Catch    Collection Time: 06/16/22  2:51 AM    Specimen: Urine, Clean Catch   Result Value Ref Range    RBC, UA 0-2 None Seen, 0-2 /HPF    WBC, UA Too Numerous to Count (A) None Seen, 0-2 /HPF    Bacteria, UA 4+ (A) None Seen, Trace /HPF    Squamous Epithelial Cells, UA 21-30 (A) None Seen, 0-2 /HPF    Hyaline Casts, UA 31-50 0 - 6 /LPF    Granular Casts, UA 3-6 None Seen /LPF    Methodology Manual Light Microscopy    Lactic Acid, Plasma    Collection Time: 06/16/22  4:12 AM    Specimen: Blood   Result Value Ref Range    Lactate 1.4 0.5 - 2.0 mmol/L   Basic Metabolic Panel    Collection Time: 06/16/22  6:11 AM    Specimen: Blood   Result Value Ref Range    Glucose 100 (H) 65 - 99 mg/dL    BUN 14 8 - 23 mg/dL    Creatinine 0.80 0.57 - 1.00 mg/dL    Sodium 137 136 - 145 mmol/L    Potassium 3.3 (L) 3.5 - 5.2 mmol/L    Chloride 105 98 - 107 mmol/L    CO2 25.0 22.0 - 29.0 mmol/L    Calcium 8.8 8.6 - 10.5 mg/dL    BUN/Creatinine Ratio 17.5 7.0 - 25.0    Anion Gap 7.0 5.0 - 15.0 mmol/L    eGFR 80.9 >60.0 mL/min/1.73   CBC (No Diff)     Collection Time: 06/16/22  6:11 AM    Specimen: Blood   Result Value Ref Range    WBC 9.15 3.40 - 10.80 10*3/mm3    RBC 3.90 3.77 - 5.28 10*6/mm3    Hemoglobin 10.4 (L) 12.0 - 15.9 g/dL    Hematocrit 32.6 (L) 34.0 - 46.6 %    MCV 83.6 79.0 - 97.0 fL    MCH 26.7 26.6 - 33.0 pg    MCHC 31.9 31.5 - 35.7 g/dL    RDW 15.0 12.3 - 15.4 %    RDW-SD 45.5 37.0 - 54.0 fl    MPV 10.8 6.0 - 12.0 fL    Platelets 313 140 - 450 10*3/mm3       If labs were ordered, I independently reviewed the results.        RADIOLOGY  CT Abdomen Pelvis With Contrast    Result Date: 6/16/2022  EXAMINATION: CT SCAN OF THE ABDOMEN AND PELVIS WITH INTRAVENOUS CONTRAST DATE OF EXAM: 6/16/2022 1:02 AM HISTORY: Severe lower abdominal pain. Nausea. Vomiting. Diarrhea. Appendectomy. Cholecystectomy. COMPARISON: None. TECHNIQUE: CT examination of the abdomen and pelvis was performed following the intravenous administration of 100 mL of Isovue-300. CT dose lowering techniques were used, to include: automated exposure control, adjustment for patient size, and/or use of iterative reconstruction. FINDINGS: ABDOMEN/PELVIS: Lower Chest: Small hiatal hernia. Liver: Normal. Gallbladder/Billary: Cholecystectomy. Pancreas: Normal. Spleen: Calcified granuloma. Adrenal Glands: Normal. Kidneys: Moderate scarring of the left kidney. Mildly heterogenous enhancement of the left kidney. Scattered renal cysts in the kidneys. GI Tract: The stomach and duodenum are unremarkable. Moderate diverticulosis of the sigmoid colon. Fluid in the lumen of the colon compatible with diarrhea. Mild thickening of the walls of a segment of the ileum is seen (900/84-68). Mesentery/Peritoneum: Normal. Vasculature: Advanced atherosclerosis of the abdominal aorta. Lymph Nodes: Normal. Abdominal Wall: Tiny indirect right inguinal hernia containing only fat. Bladder: Normal. Reproductive: Hysterectomy. Unremarkable adnexa. Musculoskeletal: Normal.     1. Findings compatible with mild  pyelonephritis involving the left kidney. Moderate scarring of the left kidney is compatible with prior pyelonephritis. 2. Findings compatible with mild enteritis involving the distal ileum. The central mesenteric vessels are patent. Infectious and inflammatory etiologies are favored. Electronically signed by:  Ortiz Sinclair M.D.  6/15/2022 11:36 PM Mountain Time      I ordered and reviewed the above noted radiographic studies.      I viewed images of CT scan of the abdomen pelvis which reveals stranding around the left kidney concerning for pyelonephritis and inflammatory changes around the distal ileum per my evaluation.    See radiologist's dictation for official interpretation.        PROCEDURES    Procedures    No orders to display       MEDICATIONS GIVEN IN ER    Medications   Sodium Chloride (PF) 0.9 % 10 mL (has no administration in time range)   amLODIPine (NORVASC) tablet 5 mg (has no administration in time range)   aspirin chewable tablet 81 mg (has no administration in time range)   cetirizine-pseudoephedrine (ZyrTEC-D) 5-120 MG per 12 hr tablet 1 tablet (has no administration in time range)   ferrous sulfate tablet 325 mg (has no administration in time range)   clobetasol (TEMOVATE) 0.05 % cream (has no administration in time range)   cholestyramine light packet 4 g (has no administration in time range)   fluticasone (FLONASE) 50 MCG/ACT nasal spray 2 spray (has no administration in time range)   losartan (COZAAR) tablet 50 mg (has no administration in time range)   levothyroxine (SYNTHROID, LEVOTHROID) tablet 125 mcg (125 mcg Oral Given 6/16/22 1568)   sertraline (ZOLOFT) tablet 50 mg (has no administration in time range)   sodium chloride 0.9 % flush 10 mL (has no administration in time range)   sodium chloride 0.9 % flush 10 mL (has no administration in time range)   lactated ringers infusion (75 mL/hr Intravenous New Bag 6/16/22 2528)   acetaminophen (TYLENOL) tablet 650 mg (has no administration in  time range)     Or   acetaminophen (TYLENOL) 160 MG/5ML solution 650 mg (has no administration in time range)     Or   acetaminophen (TYLENOL) suppository 650 mg (has no administration in time range)   cefTRIAXone (ROCEPHIN) 2 g/100 mL 0.9% NS IVPB (MBP) (has no administration in time range)   HYDROmorphone (DILAUDID) injection 0.5 mg (has no administration in time range)   potassium chloride (MICRO-K) CR capsule 40 mEq (has no administration in time range)     Or   potassium chloride (KLOR-CON) packet 40 mEq (has no administration in time range)     Or   potassium chloride 10 mEq in 100 mL IVPB (has no administration in time range)   Magnesium Sulfate 2 gram Bolus, followed by 8 gram infusion (total Mg dose 10 grams)- Mg less than or equal to 1mg/dL (has no administration in time range)     Or   Magnesium Sulfate 2 gram / 50mL Infusion (GIVE X 3 BAGS TO EQUAL 6GM TOTAL DOSE) - Mg 1.1 - 1.5 mg/dl (has no administration in time range)     Or   Magnesium Sulfate 4 gram infusion- Mg 1.6-1.9 mg/dL (has no administration in time range)   lactated ringers bolus 1,000 mL (1,000 mL Intravenous New Bag 6/16/22 0036)   ondansetron (ZOFRAN) injection 4 mg (4 mg Intravenous Given 6/16/22 0038)   ketorolac (TORADOL) injection 15 mg (15 mg Intravenous Given 6/16/22 0038)   iopamidol (ISOVUE-300) 61 % injection 100 mL (100 mL Intravenous Given 6/16/22 0112)   cefTRIAXone (ROCEPHIN) 1 g/100 mL 0.9% NS (MBP) (1 g Intravenous New Bag 6/16/22 0424)   diphenhydrAMINE (BENADRYL) injection 25 mg (25 mg Intravenous Given 6/16/22 0424)   ondansetron (ZOFRAN) injection 4 mg (4 mg Intravenous Given 6/16/22 0425)   HYDROmorphone (DILAUDID) injection 0.25 mg (0.25 mg Intravenous Given 6/16/22 0423)         PROGRESS, DATA ANALYSIS, CONSULTS, AND MEDICAL DECISION MAKING    All labs have been independently reviewed by me.  All radiology studies have been reviewed by me and the radiologist dictating the report.   EKG's have been independently  viewed and interpreted by me.          ED Course as of 06/16/22 0851   Thu Jun 16, 2022   0046 Patient presents because of pelvic pain, nausea, vomiting, diarrhea that started yesterday evening.  She says prior to that she was in her normal state of health but suddenly became very nauseous and started having lower abdominal pain.  The pain has persisted and is in the low abdomen/pelvic area.  She has not noticed any dysuria.  She denies fevers or systemic symptoms.  She has been having several episodes of diarrhea and says her vomit is green-colored she also complains of loss of taste and smell.  Currently rates her pain as severe.  Knows of nothing that makes the pain better.  No other complaints at this time. [CC]      ED Course User Index  [CC] Edwin Sanderson MD       Patient arrived awake and alert vitals within normal limits but she is significantly ill-appearing and has guarding tenderness over the urinary bladder.  Denies dysuria symptoms.  Starting on IV fluids, Zofran, Toradol, Dilaudid.  Labs concerning for leukocytosis, mild anemia.  Urinalysis does have some squamous cells but is still grossly infected with bacteria, gross pyuria, nitrite positive.  CT scan of the abdomen pelvis concerning for left-sided pyelonephritis as well as ileitis of the distal ileum.  Started on ceftriaxone for the pyelonephritis.  Continues to have nausea and was given a second dose of Zofran.  At this point I think she needs admission for IV antibiotics and further management of her pyelonephritis complicated by enteritis.  Medicine team consulted for admission.      AS OF 08:51 EDT VITALS:    BP - 125/64  HR - 60  TEMP - 97.9 °F (36.6 °C) (Oral)  O2 SATS - 93%                  DIAGNOSIS  Final diagnoses:   Pyelonephritis   Enteritis   UTI (urinary tract infection), bacterial   Generalized abdominal pain         DISPOSITION  Admit             Edwin Sanderson MD  06/16/22 2983

## 2022-06-16 NOTE — CASE MANAGEMENT/SOCIAL WORK
Discharge Planning Assessment  [facility]     Patient Name: Shireen Davenport  MRN: [id number]  Today's Date: 6/16/2022    Admit Date: 6/16/2022     Discharge Needs Assessment     Row Name 06/16/22 1033       Living Environment    People in Home alone    Current Living Arrangements condominium    Living Arrangement Comments From [place]. Lives in [place]. Visiting family here.       Discharge Needs Assessment    Equipment Currently Used at Home none    Equipment Needed After Discharge none    Discharge Coordination/Progress No DME or outpt services.               Discharge Plan     Row Name 06/16/22 1035       Plan    Plan Home at DC    Patient/Family in Agreement with Plan yes    Plan Comments I spoke with the pt. She plans to DC to her families home in [place] at [place] before traveling back to [place]. She denies any DC needs at this time.    Final Discharge Disposition Code 01 - home or self-care              Continued Care and Services - Admitted Since 6/16/2022    Coordination has not been started for this encounter.          Demographic Summary    No documentation.                Functional Status     Row Name 06/16/22 1033       Functional Status    Usual Activity Tolerance good       Functional Status, IADL    Medications independent    Meal Preparation independent    Housekeeping independent    Laundry independent    Shopping independent               Psychosocial    No documentation.                Abuse/Neglect    No documentation.                Legal    No documentation.                Substance Abuse    No documentation.                Patient Forms    No documentation.                   Vicky Napier RN

## 2022-06-16 NOTE — PLAN OF CARE
Goal Outcome Evaluation:   Admitted for pyelonephritis, nsr on monitor, vss, lr@75ml/hr.

## 2022-06-17 LAB — POTASSIUM SERPL-SCNC: 3.7 MMOL/L (ref 3.5–5.2)

## 2022-06-17 PROCEDURE — 84132 ASSAY OF SERUM POTASSIUM: CPT | Performed by: INTERNAL MEDICINE

## 2022-06-17 PROCEDURE — G0378 HOSPITAL OBSERVATION PER HR: HCPCS

## 2022-06-17 PROCEDURE — 99225 PR SBSQ OBSERVATION CARE/DAY 25 MINUTES: CPT | Performed by: INTERNAL MEDICINE

## 2022-06-17 PROCEDURE — 25010000002 CEFTRIAXONE PER 250 MG

## 2022-06-17 RX ADMIN — LEVOTHYROXINE SODIUM 125 MCG: 125 TABLET ORAL at 05:26

## 2022-06-17 RX ADMIN — CETIRIZINE HYDROCHLORIDE, PSEUDOEPHEDRINE HYDROCHLORIDE 1 TABLET: 5; 120 TABLET, FILM COATED, EXTENDED RELEASE ORAL at 20:14

## 2022-06-17 RX ADMIN — CEFTRIAXONE 2 G: 2 INJECTION, POWDER, FOR SOLUTION INTRAMUSCULAR; INTRAVENOUS at 05:26

## 2022-06-17 RX ADMIN — Medication 10 ML: at 20:15

## 2022-06-17 RX ADMIN — FLUTICASONE PROPIONATE 2 SPRAY: 50 SPRAY, METERED NASAL at 08:49

## 2022-06-17 RX ADMIN — CLOBETASOL PROPIONATE: 0.5 CREAM TOPICAL at 20:15

## 2022-06-17 RX ADMIN — SERTRALINE HYDROCHLORIDE 50 MG: 50 TABLET ORAL at 08:48

## 2022-06-17 RX ADMIN — Medication 10 ML: at 08:49

## 2022-06-17 RX ADMIN — AMLODIPINE BESYLATE 5 MG: 5 TABLET ORAL at 08:48

## 2022-06-17 RX ADMIN — LOSARTAN POTASSIUM 50 MG: 50 TABLET, FILM COATED ORAL at 08:48

## 2022-06-17 RX ADMIN — ASPIRIN 81 MG CHEWABLE TABLET 81 MG: 81 TABLET CHEWABLE at 08:48

## 2022-06-17 RX ADMIN — CETIRIZINE HYDROCHLORIDE, PSEUDOEPHEDRINE HYDROCHLORIDE 1 TABLET: 5; 120 TABLET, FILM COATED, EXTENDED RELEASE ORAL at 08:56

## 2022-06-17 RX ADMIN — CHOLESTYRAMINE 4 G: 4 POWDER, FOR SUSPENSION ORAL at 08:56

## 2022-06-17 RX ADMIN — FERROUS SULFATE TAB 325 MG (65 MG ELEMENTAL FE) 325 MG: 325 (65 FE) TAB at 08:48

## 2022-06-17 NOTE — CASE MANAGEMENT/SOCIAL WORK
Continued Stay Note  [facility]     Patient Name: Shireen Davenport  MRN: [id number]  Today's Date: 6/17/2022    Admit Date: 6/16/2022     Discharge Plan     Row Name 06/17/22 1100       Plan    Plan Home at DC    Patient/Family in Agreement with Plan yes    Plan Comments I spoke with the pt. Gave her a letter she had been hospitalized for her hotel and other travel bills. She will have transport home from the hospital. Denies other DC needs.    Final Discharge Disposition Code 01 - home or self-care               Discharge Codes    No documentation.                     Vicky Napier RN

## 2022-06-17 NOTE — PROGRESS NOTES
[facility] Medicine Services  PROGRESS NOTE    Patient Name: Shireen Davenport  : 1955  MRN: [id number]    Date of Admission: 2022  Primary Care Physician: Provider, No Known    Subjective   Subjective     CC:  N/V    HPI:  Resting in a chair in no acute distress.  She feels better and overall comfortable except that she still has diarrhea but she tells me this is chronic.  No fever or chills.  No chest pain or palpitation or shortness of breath at rest.  No nausea vomiting, has diarrhea.    ROS:  As above    Objective   Objective     Vital Signs:   Temp:  [98 °F (36.7 °C)-98.5 °F (36.9 °C)] 98 °F (36.7 °C)  Heart Rate:  [62-72] 72  Resp:  [16-18] 18  BP: (118-149)/(62-82) 149/82  Flow (L/min):  [2] 2     Physical Exam:  Constitutional: No acute distress, looks comfortable  HENT: NCAT, mucous membranes moist  Respiratory: Clear to auscultation bilaterally, respiratory effort normal   Cardiovascular: RRR, no murmurs, rubs, or gallops  Gastrointestinal: Abdomen is obese.  Positive bowel sounds, soft, nontender, nondistended  Musculoskeletal:  bilateral ankle edema, severe obesity  Psychiatric: Appropriate affect, cooperative  Neurologic: Awake, alert, oriented x3, no focality appreciated, speech clear  Skin: No rashes    Results Reviewed:  LAB RESULTS:      Lab 22  0611 22  0412 06/15/22  2043   WBC 9.15  --  11.18*   HEMOGLOBIN 10.4*  --  11.4*   HEMATOCRIT 32.6*  --  36.0   PLATELETS 313  --  370   NEUTROS ABS  --   --  7.72*   IMMATURE GRANS (ABS)  --   --  0.04   LYMPHS ABS  --   --  2.48   MONOS ABS  --   --  0.88   EOS ABS  --   --  0.04   MCV 83.6  --  84.9   LACTATE  --  1.4 1.9         Lab 22  0603 22  0611 06/15/22  2043   SODIUM  --  137 138   POTASSIUM 3.7 3.3* 3.5   CHLORIDE  --  105 104   CO2  --  25.0 24.0   ANION GAP  --  7.0 10.0   BUN  --  14 13   CREATININE  --  0.80 0.86   EGFR  --  80.9 74.2   GLUCOSE  --  100* 103*   CALCIUM  --  8.8  9.1   MAGNESIUM  --   --  1.9         Lab 06/15/22  2043   TOTAL PROTEIN 6.9   ALBUMIN 3.70   GLOBULIN 3.2   ALT (SGPT) 16   AST (SGOT) 18   BILIRUBIN 0.4   ALK PHOS 130*   LIPASE 16         Lab 06/15/22  2043   TROPONIN T <0.010                 Brief Urine Lab Results  (Last result in the past 365 days)      Color   Clarity   Blood   Leuk Est   Nitrite   Protein   CREAT   Urine HCG        06/16/22 0251 Yellow   Turbid   Negative   Moderate (2+)   Positive   30 mg/dL (1+)                 Microbiology Results Abnormal     Procedure Component Value - Date/Time    Urine Culture - Urine, Urine, Clean Catch [[id number]]  (Normal) Collected: 06/16/22 0251    Lab Status: Preliminary result Specimen: Urine, Clean Catch Updated: 06/17/22 1057     Urine Culture Culture in progress    Blood Culture - Blood, Hand, Right [[id number]]  (Normal) Collected: 06/16/22 0412    Lab Status: Preliminary result Specimen: Blood from Hand, Right Updated: 06/17/22 0432     Blood Culture No growth at 24 hours    Blood Culture - Blood, Blood, Venous Line [[id number]]  (Normal) Collected: 06/16/22 0412    Lab Status: Preliminary result Specimen: Blood, Venous Line Updated: 06/17/22 0432     Blood Culture No growth at 24 hours    COVID PRE-OP / PRE-PROCEDURE SCREENING ORDER (NO ISOLATION) - Swab, Nasopharynx [[id number]]  (Normal) Collected: 06/16/22 0045    Lab Status: Final result Specimen: Swab from Nasopharynx Updated: 06/16/22 0139    Narrative:      The following orders were created for panel order COVID PRE-OP / PRE-PROCEDURE SCREENING ORDER (NO ISOLATION) - Swab, Nasopharynx.  Procedure                               Abnormality         Status                     ---------                               -----------         ------                     COVID-19, FLU A/B, RSV P...[[id number]]  Normal              Final result                 Please view results for these tests on the individual orders.    COVID-19, FLU A/B, RSV PCR - Swab,  Nasopharynx [[id number]]  (Normal) Collected: 06/16/22 0045    Lab Status: Final result Specimen: Swab from Nasopharynx Updated: 06/16/22 0139     COVID19 Not Detected     Influenza A PCR Not Detected     Influenza B PCR Not Detected     RSV, PCR Not Detected    Narrative:      Fact sheet for providers: https://www.fda.gov/media/786835/download    Fact sheet for patients: https://www.fda.gov/media/680041/download    Test performed by PCR.          CT Abdomen Pelvis With Contrast    Result Date: 6/16/2022  EXAMINATION: CT SCAN OF THE ABDOMEN AND PELVIS WITH INTRAVENOUS CONTRAST DATE OF EXAM: 6/16/2022 1:02 AM HISTORY: Severe lower abdominal pain. Nausea. Vomiting. Diarrhea. Appendectomy. Cholecystectomy. COMPARISON: None. TECHNIQUE: CT examination of the abdomen and pelvis was performed following the intravenous administration of 100 mL of Isovue-300. CT dose lowering techniques were used, to include: automated exposure control, adjustment for patient size, and/or use of iterative reconstruction. FINDINGS: ABDOMEN/PELVIS: Lower Chest: Small hiatal hernia. Liver: Normal. Gallbladder/Billary: Cholecystectomy. Pancreas: Normal. Spleen: Calcified granuloma. Adrenal Glands: Normal. Kidneys: Moderate scarring of the left kidney. Mildly heterogenous enhancement of the left kidney. Scattered renal cysts in the kidneys. GI Tract: The stomach and duodenum are unremarkable. Moderate diverticulosis of the sigmoid colon. Fluid in the lumen of the colon compatible with diarrhea. Mild thickening of the walls of a segment of the ileum is seen (900/84-68). Mesentery/Peritoneum: Normal. Vasculature: Advanced atherosclerosis of the abdominal aorta. Lymph Nodes: Normal. Abdominal Wall: Tiny indirect right inguinal hernia containing only fat. Bladder: Normal. Reproductive: Hysterectomy. Unremarkable adnexa. Musculoskeletal: Normal.     Impression: 1. Findings compatible with mild pyelonephritis involving the left kidney. Moderate scarring  of the left kidney is compatible with prior pyelonephritis. 2. Findings compatible with mild enteritis involving the distal ileum. The central mesenteric vessels are patent. Infectious and inflammatory etiologies are favored. Electronically signed by:  Ortiz Sinclair M.D.  6/15/2022 11:36 PM Mountain Time          I have reviewed the medications:  Scheduled Meds:amLODIPine, 5 mg, Oral, Daily  aspirin, 81 mg, Oral, Daily  cefTRIAXone, 2 g, Intravenous, Q24H  cetirizine-pseudoephedrine, 1 tablet, Oral, BID  cholestyramine light, 1 packet, Oral, Daily  clobetasol, , Topical, Q12H  ferrous sulfate, 325 mg, Oral, Daily With Breakfast  fluticasone, 2 spray, Nasal, Daily  levothyroxine, 125 mcg, Oral, Q AM  losartan, 50 mg, Oral, Q24H  sertraline, 50 mg, Oral, Daily  sodium chloride, 10 mL, Intravenous, Q12H      Continuous Infusions:   PRN Meds:.•  acetaminophen **OR** acetaminophen **OR** acetaminophen  •  HYDROmorphone  •  magnesium sulfate **OR** magnesium sulfate **OR** magnesium sulfate  •  potassium chloride **OR** potassium chloride **OR** potassium chloride  •  Sodium Chloride (PF)  •  sodium chloride    Assessment & Plan   Assessment & Plan     Active Hospital Problems    Diagnosis  POA   • Pyelonephritis [N12]  Yes   • Leukocytosis [D72.829]  Yes   • UTI (urinary tract infection), bacterial [N39.0, A49.9]  Yes   • Enteritis [K52.9]  Yes   • Diarrhea [R19.7]  Yes   • Hypothyroidism [E03.9]  Yes   • Hypertension [I10]  Yes      Resolved Hospital Problems   No resolved problems to display.        Brief Hospital Course to date:  Shireen Davenport is a 66 y.o. female with past medical history significant for hypertension, hypothyroidism, history of multiple kidney stones with recent stone extraction in [place].  Patient also has frequent UTIs.  Patient was admitted for nausea vomiting and diarrhea and work-up at the emergency room showed nitrite and leukocyte esterase in the urine.  CT scan also shows evidence of left  pyelonephritis.  Patient was admitted and was started on Rocephin.    *Nausea vomiting and left costovertebral tenderness secondary to pyelonephritis.  Patient is currently on Rocephin.  Symptoms have improved significantly.  UA was positive for nitrite and leukocyte esterase.  Urine culture shows evidence of gram-negative rods further identification pending.    *History of kidney stones and recent history of stone extraction.  Patient also reports multiple UTIs.  Patient has a urologist in [place] whom she follows with.    *Diarrhea possibly secondary to viral enteritis.  Patient tells me that she has chronic diarrhea.    *Hypertension, currently controlled.    *Hypothyroidism on home dose Synthroid.    *Severe obesity    *Depression, on Zoloft.  PLAN:  - continue current care  - follow urine culture  - labs in am  - possibly hoe tomorrow if stable.    DVT prophylaxis:  Mechanical DVT prophylaxis orders are present.       AM-PAC 6 Clicks Score (PT): 24 (06/17/22 0800)    Disposition:  Home in a few days..    CODE STATUS:   Code Status and Medical Interventions:   Ordered at: 06/16/22 0509     Level Of Support Discussed With:    Patient     Code Status (Patient has no pulse and is not breathing):    CPR (Attempt to Resuscitate)     Medical Interventions (Patient has pulse or is breathing):    Full Support       Rangel Bess MD  06/17/22

## 2022-06-17 NOTE — PLAN OF CARE
Goal Outcome Evaluation:  Plan of Care Reviewed With: patient        Progress: no change     VSS, O2 on RA, NSR per monitor. No c/o pain or discomfort overnight. Pt rested well this shift.       Problem: Adult Inpatient Plan of Care  Goal: Absence of Hospital-Acquired Illness or Injury  Intervention: Identify and Manage Fall Risk  Recent Flowsheet Documentation  Taken 6/17/2022 0600 by Chantale Spicer RN  Safety Promotion/Fall Prevention:   activity supervised   assistive device/personal items within reach   clutter free environment maintained   fall prevention program maintained   lighting adjusted   nonskid shoes/slippers when out of bed   room organization consistent   safety round/check completed   toileting scheduled  Taken 6/17/2022 0405 by Chantale Spicer RN  Safety Promotion/Fall Prevention:   activity supervised   assistive device/personal items within reach   clutter free environment maintained   fall prevention program maintained   lighting adjusted   nonskid shoes/slippers when out of bed   room organization consistent   safety round/check completed   toileting scheduled  Taken 6/17/2022 0200 by Chantale Spicer RN  Safety Promotion/Fall Prevention:   activity supervised   assistive device/personal items within reach   clutter free environment maintained   fall prevention program maintained   lighting adjusted   nonskid shoes/slippers when out of bed   room organization consistent   safety round/check completed   toileting scheduled  Taken 6/17/2022 0000 by Chantale Spicer RN  Safety Promotion/Fall Prevention:   activity supervised   assistive device/personal items within reach   clutter free environment maintained   fall prevention program maintained   lighting adjusted   nonskid shoes/slippers when out of bed   room organization consistent   safety round/check completed   toileting scheduled  Taken 6/16/2022 2200 by Chantale Spicer RN  Safety Promotion/Fall Prevention:   activity supervised    assistive device/personal items within reach   clutter free environment maintained   fall prevention program maintained   lighting adjusted   nonskid shoes/slippers when out of bed   room organization consistent   safety round/check completed   toileting scheduled  Taken 6/16/2022 2000 by Chantale Spicer RN  Safety Promotion/Fall Prevention:   activity supervised   assistive device/personal items within reach   clutter free environment maintained   fall prevention program maintained   lighting adjusted   nonskid shoes/slippers when out of bed   room organization consistent   safety round/check completed   toileting scheduled

## 2022-06-18 PROCEDURE — 99225 PR SBSQ OBSERVATION CARE/DAY 25 MINUTES: CPT | Performed by: INTERNAL MEDICINE

## 2022-06-18 PROCEDURE — 25010000002 CEFTRIAXONE PER 250 MG

## 2022-06-18 PROCEDURE — G0378 HOSPITAL OBSERVATION PER HR: HCPCS

## 2022-06-18 RX ADMIN — LOSARTAN POTASSIUM 50 MG: 50 TABLET, FILM COATED ORAL at 10:21

## 2022-06-18 RX ADMIN — AMLODIPINE BESYLATE 5 MG: 5 TABLET ORAL at 10:20

## 2022-06-18 RX ADMIN — FLUTICASONE PROPIONATE 2 SPRAY: 50 SPRAY, METERED NASAL at 10:23

## 2022-06-18 RX ADMIN — CEFTRIAXONE 2 G: 2 INJECTION, POWDER, FOR SOLUTION INTRAMUSCULAR; INTRAVENOUS at 06:15

## 2022-06-18 RX ADMIN — ASPIRIN 81 MG CHEWABLE TABLET 81 MG: 81 TABLET CHEWABLE at 10:20

## 2022-06-18 RX ADMIN — FERROUS SULFATE TAB 325 MG (65 MG ELEMENTAL FE) 325 MG: 325 (65 FE) TAB at 10:21

## 2022-06-18 RX ADMIN — Medication 10 ML: at 13:22

## 2022-06-18 RX ADMIN — CETIRIZINE HYDROCHLORIDE, PSEUDOEPHEDRINE HYDROCHLORIDE 1 TABLET: 5; 120 TABLET, FILM COATED, EXTENDED RELEASE ORAL at 13:21

## 2022-06-18 RX ADMIN — SERTRALINE HYDROCHLORIDE 50 MG: 50 TABLET ORAL at 10:21

## 2022-06-18 RX ADMIN — Medication 10 ML: at 20:11

## 2022-06-18 RX ADMIN — LEVOTHYROXINE SODIUM 125 MCG: 125 TABLET ORAL at 06:15

## 2022-06-18 RX ADMIN — CLOBETASOL PROPIONATE: 0.5 CREAM TOPICAL at 10:21

## 2022-06-18 RX ADMIN — CHOLESTYRAMINE 4 G: 4 POWDER, FOR SUSPENSION ORAL at 08:26

## 2022-06-18 NOTE — PROGRESS NOTES
[facility] Medicine Services  PROGRESS NOTE    Patient Name: Shireen Davenport  : 1955  MRN: [id number]    Date of Admission: 2022  Primary Care Physician: Provider, No Known    Subjective   Subjective     CC:  N/V    HPI:  Doing fine. Sitting in chair. Tries to get her daughter on the phone but she did not answer. She lives in [place]. Patient tells me she feels ok and is tolerating oral intake fine    ROS:  Gen- No fevers, chills  CV- No chest pain, palpitations  Resp- No cough, dyspnea  GI- No N/V/D, abd pain        Objective   Objective     Vital Signs:   Temp:  [98 °F (36.7 °C)-98.2 °F (36.8 °C)] 98.1 °F (36.7 °C)  Heart Rate:  [57-65] 64  Resp:  [17-18] 18  BP: (135-154)/(59-73) 154/73     Physical Exam:  Constitutional: No acute distress, looks comfortable, sitting up in chair   HENT: NCAT, mucous membranes moist  Respiratory: Clear to auscultation bilaterally, respiratory effort normal   Cardiovascular: RRR, no murmurs, rubs, or gallops  Gastrointestinal: Abdomen is obese.  Positive bowel sounds, soft, nontender, nondistended no flank pain  Musculoskeletal:  bilateral ankle edema, severe obesity  Psychiatric: Appropriate affect, cooperative  Neurologic: Awake, alert, oriented x3, no focality appreciated, speech clear  Skin: No rashes    Results Reviewed:  LAB RESULTS:      Lab 22  0611 22  0412 06/15/22  2043   WBC 9.15  --  11.18*   HEMOGLOBIN 10.4*  --  11.4*   HEMATOCRIT 32.6*  --  36.0   PLATELETS 313  --  370   NEUTROS ABS  --   --  7.72*   IMMATURE GRANS (ABS)  --   --  0.04   LYMPHS ABS  --   --  2.48   MONOS ABS  --   --  0.88   EOS ABS  --   --  0.04   MCV 83.6  --  84.9   LACTATE  --  1.4 1.9         Lab 22  0603 22  0611 06/15/22  2043   SODIUM  --  137 138   POTASSIUM 3.7 3.3* 3.5   CHLORIDE  --  105 104   CO2  --  25.0 24.0   ANION GAP  --  7.0 10.0   BUN  --  14 13   CREATININE  --  0.80 0.86   EGFR  --  80.9 74.2   GLUCOSE  --  100*  103*   CALCIUM  --  8.8 9.1   MAGNESIUM  --   --  1.9         Lab 06/15/22  2043   TOTAL PROTEIN 6.9   ALBUMIN 3.70   GLOBULIN 3.2   ALT (SGPT) 16   AST (SGOT) 18   BILIRUBIN 0.4   ALK PHOS 130*   LIPASE 16         Lab 06/15/22  2043   TROPONIN T <0.010                 Brief Urine Lab Results  (Last result in the past 365 days)      Color   Clarity   Blood   Leuk Est   Nitrite   Protein   CREAT   Urine HCG        06/16/22 0251 Yellow   Turbid   Negative   Moderate (2+)   Positive   30 mg/dL (1+)                 Microbiology Results Abnormal     Procedure Component Value - Date/Time    Blood Culture - Blood, Hand, Right [[id number]]  (Normal) Collected: 06/16/22 0412    Lab Status: Preliminary result Specimen: Blood from Hand, Right Updated: 06/18/22 0430     Blood Culture No growth at 2 days    Blood Culture - Blood, Blood, Venous Line [[id number]]  (Normal) Collected: 06/16/22 0412    Lab Status: Preliminary result Specimen: Blood, Venous Line Updated: 06/18/22 0430     Blood Culture No growth at 2 days    Urine Culture - Urine, Urine, Clean Catch [[id number]]  (Normal) Collected: 06/16/22 0251    Lab Status: Preliminary result Specimen: Urine, Clean Catch Updated: 06/17/22 1057     Urine Culture Culture in progress    COVID PRE-OP / PRE-PROCEDURE SCREENING ORDER (NO ISOLATION) - Swab, Nasopharynx [[id number]]  (Normal) Collected: 06/16/22 0045    Lab Status: Final result Specimen: Swab from Nasopharynx Updated: 06/16/22 0139    Narrative:      The following orders were created for panel order COVID PRE-OP / PRE-PROCEDURE SCREENING ORDER (NO ISOLATION) - Swab, Nasopharynx.  Procedure                               Abnormality         Status                     ---------                               -----------         ------                     COVID-19, FLU A/B, RSV P...[[id number]]  Normal              Final result                 Please view results for these tests on the individual orders.    COVID-19, FLU A/B, RSV  PCR - Swab, Nasopharynx [[id number]]  (Normal) Collected: 06/16/22 0045    Lab Status: Final result Specimen: Swab from Nasopharynx Updated: 06/16/22 0139     COVID19 Not Detected     Influenza A PCR Not Detected     Influenza B PCR Not Detected     RSV, PCR Not Detected    Narrative:      Fact sheet for providers: https://www.fda.gov/media/288235/download    Fact sheet for patients: https://www.fda.gov/media/977906/download    Test performed by PCR.          No radiology results from the last 24 hrs        I have reviewed the medications:  Scheduled Meds:amLODIPine, 5 mg, Oral, Daily  aspirin, 81 mg, Oral, Daily  cefTRIAXone, 2 g, Intravenous, Q24H  cetirizine-pseudoephedrine, 1 tablet, Oral, BID  cholestyramine light, 1 packet, Oral, Daily  clobetasol, , Topical, Q12H  ferrous sulfate, 325 mg, Oral, Daily With Breakfast  fluticasone, 2 spray, Nasal, Daily  levothyroxine, 125 mcg, Oral, Q AM  losartan, 50 mg, Oral, Q24H  sertraline, 50 mg, Oral, Daily  sodium chloride, 10 mL, Intravenous, Q12H      Continuous Infusions:   PRN Meds:.•  acetaminophen **OR** acetaminophen **OR** acetaminophen  •  HYDROmorphone  •  magnesium sulfate **OR** magnesium sulfate **OR** magnesium sulfate  •  potassium chloride **OR** potassium chloride **OR** potassium chloride  •  Sodium Chloride (PF)  •  sodium chloride    Assessment & Plan   Assessment & Plan     Active Hospital Problems    Diagnosis  POA   • Pyelonephritis [N12]  Yes   • Leukocytosis [D72.829]  Yes   • UTI (urinary tract infection), bacterial [N39.0, A49.9]  Yes   • Enteritis [K52.9]  Yes   • Diarrhea [R19.7]  Yes   • Hypothyroidism [E03.9]  Yes   • Hypertension [I10]  Yes      Resolved Hospital Problems   No resolved problems to display.        Brief Hospital Course to date:  Shireen Davenport is a 66 y.o. female with past medical history significant for hypertension, hypothyroidism, history of multiple kidney stones with recent stone extraction in [place].  Patient also  has frequent UTIs.  Patient was admitted for nausea vomiting and diarrhea and work-up at the emergency room showed nitrite and leukocyte esterase in the urine.  CT scan also shows evidence of left pyelonephritis.  Patient was admitted and was started on Rocephin.    *Nausea vomiting and initially left costovertebral tenderness secondary to pyelonephritis.    --Patient is currently on Rocephin.  Symptoms have improved significantly.  UA was positive for nitrite and leukocyte esterase.    --urine cx is pending with growth in progress- have d/w patient and will wait on culture ID prior to switching to orals. Patient lives [place]     *History of kidney stones and recent history of stone extraction.  Patient also reports multiple UTIs.  Patient has a urologist in [place] whom she follows with.    *Diarrhea possibly secondary to viral enteritis.  Patient tells me that she has chronic diarrhea.    *Hypertension, currently controlled.    *Hypothyroidism on home dose Synthroid.    *Severe obesity    *Depression, on Zoloft.    Hopefully home as soon as culture results- later today vs tomorrow    DVT prophylaxis:  Mechanical DVT prophylaxis orders are present.       AM-PAC 6 Clicks Score (PT): 24 (06/17/22 0800)    Disposition:  Home in a few days..    CODE STATUS:   Code Status and Medical Interventions:   Ordered at: 06/16/22 0509     Level Of Support Discussed With:    Patient     Code Status (Patient has no pulse and is not breathing):    CPR (Attempt to Resuscitate)     Medical Interventions (Patient has pulse or is breathing):    Full Support       Kristin Vieira MD  06/18/22

## 2022-06-18 NOTE — PLAN OF CARE
Goal Outcome Evaluation:   Pt did have to be placed on 2l/nc, sats dropped to 87% on room air. Nsr on monitor

## 2022-06-18 NOTE — PLAN OF CARE
Problem: Adult Inpatient Plan of Care  Goal: Plan of Care Review  Outcome: Ongoing, Progressing  Flowsheets (Taken 6/17/2022 0629 by Chantale Spicer, RN)  Progress: no change  Plan of Care Reviewed With: patient  Goal: Patient-Specific Goal (Individualized)  Outcome: Ongoing, Progressing  Goal: Absence of Hospital-Acquired Illness or Injury  Outcome: Ongoing, Progressing  Intervention: Identify and Manage Fall Risk  Description: Perform standard risk assessment on admission using a validated tool or comprehensive approach appropriate to the patient; reassess fall risk frequently, with change in status or transfer to another level of care.  Communicate fall injury risk to interprofessional healthcare team.  Determine need for increased observation, equipment and environmental modification, such as low bed, signage and supportive, nonskid footwear.  Adjust safety measures to individual developmental age, stage and identified risk factors.  Reinforce the importance of safety and physical activity with patient and family.  Perform regular intentional rounding to assess need for position change, pain assessment and personal needs, including assistance with toileting.  Flowsheets  Taken 6/18/2022 1700 by Jeramy Squires, PCT  Safety Promotion/Fall Prevention:   activity supervised   assistive device/personal items within reach   clutter free environment maintained   fall prevention program maintained   gait belt   lighting adjusted   nonskid shoes/slippers when out of bed   room organization consistent   safety round/check completed  Taken 6/18/2022 1622 by Christie Stanley, RN  Safety Promotion/Fall Prevention:   activity supervised   assistive device/personal items within reach   clutter free environment maintained   room organization consistent   safety round/check completed   toileting scheduled   fall prevention program maintained   muscle strengthening facilitated  Taken 6/18/2022 1408 by Christie Stanley, RN  Safety  Promotion/Fall Prevention:   activity supervised   assistive device/personal items within reach   clutter free environment maintained   nonskid shoes/slippers when out of bed   safety round/check completed   toileting scheduled   room organization consistent   elopement precautions  Taken 6/18/2022 1256 by Christie Stanley RN  Safety Promotion/Fall Prevention:   activity supervised   clutter free environment maintained   safety round/check completed   room organization consistent   fall prevention program maintained   muscle strengthening facilitated   nonskid shoes/slippers when out of bed  Taken 6/18/2022 1020 by Christie Stanley RN  Safety Promotion/Fall Prevention:   activity supervised   assistive device/personal items within reach   safety round/check completed   room organization consistent   clutter free environment maintained   fall prevention program maintained   nonskid shoes/slippers when out of bed  Taken 6/18/2022 0826 by Christie Stanley RN  Safety Promotion/Fall Prevention:   activity supervised   nonskid shoes/slippers when out of bed  Intervention: Prevent Skin Injury  Description: Perform a screening for skin injury risk, such as pressure or moisture associated skin damage on admission and at regular intervals throughout hospital stay.  Keep all areas of skin (especially folds) clean and dry.  Maintain adequate skin hydration.  Relieve and redistribute pressure and protect bony prominences; implement measures based on patient-specific risk factors.  Match turning and repositioning schedule to clinical condition.  Encourage weight shift frequently; assist with reposition if unable to complete independently.  Float heels off bed; avoid pressure on the Achilles tendon.  Keep skin free from extended contact with medical devices.  Encourage functional activity and mobility, as early as tolerated.  Use aids (e.g., slide boards, mechanical lift) during transfer.  Flowsheets  Taken 6/18/2022 1700 by Shaw  ONESIMO Deshpande  Body Position: position changed independently  Taken 6/18/2022 1408 by Christie Stanley RN  Body Position: position changed independently  Taken 6/18/2022 1256 by Christie Stanley RN  Body Position: position changed independently  Taken 6/18/2022 1020 by Christie Stanley RN  Body Position: position changed independently  Taken 6/18/2022 0826 by Christie Stanley RN  Body Position: position changed independently  Intervention: Prevent and Manage VTE (Venous Thromboembolism) Risk  Description: Assess for VTE (venous thromboembolism) risk.  Encourage and assist with early ambulation.  Initiate and maintain compression or other therapy, as indicated, based on identified risk in accordance with organizational protocol and provider order.  Encourage both active and passive leg exercises while in bed, if unable to ambulate.  Flowsheets  Taken 6/18/2022 1700 by Jeramy Squires PCT  Activity Management: activity adjusted per tolerance  Taken 6/18/2022 0826 by Christie Stanley RN  Activity Management: activity adjusted per tolerance  VTE Prevention/Management:   bilateral   dorsiflexion/plantar flexion performed  Intervention: Prevent Infection  Description: Maintain skin and mucous membrane integrity; promote hand, oral and pulmonary hygiene.  Optimize fluid balance, nutrition, sleep and glycemic control to maximize infection resistance.  Identify potential sources of infection early to prevent or mitigate progression of infection (e.g., wound, lines, devices).  Evaluate ongoing need for invasive devices; remove promptly when no longer indicated.  Flowsheets  Taken 6/18/2022 1622  Infection Prevention: environmental surveillance performed  Taken 6/18/2022 1408  Infection Prevention: environmental surveillance performed  Taken 6/18/2022 1020  Infection Prevention: environmental surveillance performed  Taken 6/18/2022 0826  Infection Prevention: hand hygiene promoted  Goal: Optimal Comfort and Wellbeing  Outcome:  Ongoing, Progressing  Intervention: Provide Person-Centered Care  Description: Use a family-focused approach to care.  Develop trust and rapport by proactively providing information, encouraging questions, addressing concerns and offering reassurance.  Acknowledge emotional response to hospitalization.  Recognize and utilize personal coping strategies.  Honor spiritual and cultural preferences.  Flowsheets (Taken 6/18/2022 0816)  Trust Relationship/Rapport:   care explained   choices provided   questions answered  Goal: Readiness for Transition of Care  Outcome: Ongoing, Progressing  Intervention: Mutually Develop Transition Plan  Description: Identify available resources for support (e.g., family, friends, community).  Identify and address barriers to ongoing treatment and home management (e.g., environmental, financial).  Provide opportunities to practice self-management skills.  Assess and monitor emotional readiness for transition.  Establish or reconnect linkage with outpatient providers or community-based services.  Flowsheets  Taken 6/16/2022 1033 by Vicky Napier, RN  Equipment Needed After Discharge: none  Discharge Coordination/Progress: No DME or outpt services.  Equipment Currently Used at Home: none  Taken 6/16/2022 0459 by Roseann Bella, RN  Transportation Anticipated: family or friend will provide  Patient/Family Anticipated Services at Transition: none  Patient/Family Anticipates Transition to: home  Goal: Plan of Care Review  Outcome: Ongoing, Progressing  Flowsheets (Taken 6/17/2022 0629 by Chantale Spicer, RN)  Progress: no change  Plan of Care Reviewed With: patient  Goal: Patient-Specific Goal (Individualized)  Outcome: Ongoing, Progressing  Goal: Absence of Hospital-Acquired Illness or Injury  Outcome: Ongoing, Progressing  Intervention: Identify and Manage Fall Risk  Description: Perform standard risk assessment on admission using a validated tool or comprehensive approach appropriate to  the patient; reassess fall risk frequently, with change in status or transfer to another level of care.  Communicate fall injury risk to interprofessional healthcare team.  Determine need for increased observation, equipment and environmental modification, such as low bed, signage and supportive, nonskid footwear.  Adjust safety measures to individual developmental age, stage and identified risk factors.  Reinforce the importance of safety and physical activity with patient and family.  Perform regular intentional rounding to assess need for position change, pain assessment and personal needs, including assistance with toileting.  Flowsheets  Taken 6/18/2022 1700 by Jeramy Squires PCT  Safety Promotion/Fall Prevention:   activity supervised   assistive device/personal items within reach   clutter free environment maintained   fall prevention program maintained   gait belt   lighting adjusted   nonskid shoes/slippers when out of bed   room organization consistent   safety round/check completed  Taken 6/18/2022 1622 by Christie Stanley, RN  Safety Promotion/Fall Prevention:   activity supervised   assistive device/personal items within reach   clutter free environment maintained   room organization consistent   safety round/check completed   toileting scheduled   fall prevention program maintained   muscle strengthening facilitated  Taken 6/18/2022 1408 by Christie Stanley, RN  Safety Promotion/Fall Prevention:   activity supervised   assistive device/personal items within reach   clutter free environment maintained   nonskid shoes/slippers when out of bed   safety round/check completed   toileting scheduled   room organization consistent   elopement precautions  Taken 6/18/2022 1256 by Christie Stanley, RN  Safety Promotion/Fall Prevention:   activity supervised   clutter free environment maintained   safety round/check completed   room organization consistent   fall prevention program maintained   muscle strengthening  facilitated   nonskid shoes/slippers when out of bed  Taken 6/18/2022 1020 by Christie Stanley RN  Safety Promotion/Fall Prevention:   activity supervised   assistive device/personal items within reach   safety round/check completed   room organization consistent   clutter free environment maintained   fall prevention program maintained   nonskid shoes/slippers when out of bed  Taken 6/18/2022 0826 by Christie Stanley RN  Safety Promotion/Fall Prevention:   activity supervised   nonskid shoes/slippers when out of bed  Intervention: Prevent Skin Injury  Description: Perform a screening for skin injury risk, such as pressure or moisture associated skin damage on admission and at regular intervals throughout hospital stay.  Keep all areas of skin (especially folds) clean and dry.  Maintain adequate skin hydration.  Relieve and redistribute pressure and protect bony prominences; implement measures based on patient-specific risk factors.  Match turning and repositioning schedule to clinical condition.  Encourage weight shift frequently; assist with reposition if unable to complete independently.  Float heels off bed; avoid pressure on the Achilles tendon.  Keep skin free from extended contact with medical devices.  Encourage functional activity and mobility, as early as tolerated.  Use aids (e.g., slide boards, mechanical lift) during transfer.  Recent Flowsheet Documentation  Taken 6/18/2022 1408 by Christie Stanley RN  Body Position: position changed independently  Taken 6/18/2022 1256 by Christie Stanley RN  Body Position: position changed independently  Taken 6/18/2022 1020 by Christie Stanley RN  Body Position: position changed independently  Taken 6/18/2022 0826 by Christie Stanley RN  Body Position: position changed independently  Intervention: Prevent and Manage VTE (Venous Thromboembolism) Risk  Description: Assess for VTE (venous thromboembolism) risk.  Encourage and assist with early ambulation.  Initiate and  maintain compression or other therapy, as indicated, based on identified risk in accordance with organizational protocol and provider order.  Encourage both active and passive leg exercises while in bed, if unable to ambulate.  Recent Flowsheet Documentation  Taken 6/18/2022 0826 by Christie Stanley RN  Activity Management: activity adjusted per tolerance  VTE Prevention/Management:   bilateral   dorsiflexion/plantar flexion performed  Intervention: Prevent Infection  Description: Maintain skin and mucous membrane integrity; promote hand, oral and pulmonary hygiene.  Optimize fluid balance, nutrition, sleep and glycemic control to maximize infection resistance.  Identify potential sources of infection early to prevent or mitigate progression of infection (e.g., wound, lines, devices).  Evaluate ongoing need for invasive devices; remove promptly when no longer indicated.  Recent Flowsheet Documentation  Taken 6/18/2022 1622 by Christie Stanley RN  Infection Prevention: environmental surveillance performed  Taken 6/18/2022 1408 by Christie Stanley RN  Infection Prevention: environmental surveillance performed  Taken 6/18/2022 1020 by Christie Stanley RN  Infection Prevention: environmental surveillance performed  Taken 6/18/2022 0826 by Christie Stanley RN  Infection Prevention: hand hygiene promoted  Goal: Optimal Comfort and Wellbeing  Outcome: Ongoing, Progressing  Intervention: Provide Person-Centered Care  Description: Use a family-focused approach to care.  Develop trust and rapport by proactively providing information, encouraging questions, addressing concerns and offering reassurance.  Acknowledge emotional response to hospitalization.  Recognize and utilize personal coping strategies.  Honor spiritual and cultural preferences.  Recent Flowsheet Documentation  Taken 6/18/2022 0826 by Christie Stanley RN  Trust Relationship/Rapport:   care explained   choices provided   questions answered  Goal: Readiness for  Transition of Care  Outcome: Ongoing, Progressing     Problem: UTI (Urinary Tract Infection)  Goal: Improved Infection Symptoms  Outcome: Ongoing, Progressing   Goal Outcome Evaluation:      VSS this shift. Pt pleasant, no complaints of pain or discomfort with voiding. Pt. Still waiting on blood cultures to be discharged.

## 2022-06-19 VITALS
WEIGHT: 266.5 LBS | DIASTOLIC BLOOD PRESSURE: 59 MMHG | HEART RATE: 69 BPM | SYSTOLIC BLOOD PRESSURE: 112 MMHG | RESPIRATION RATE: 18 BRPM | HEIGHT: 66 IN | OXYGEN SATURATION: 100 % | TEMPERATURE: 98 F | BODY MASS INDEX: 42.83 KG/M2

## 2022-06-19 LAB
BACTERIA SPEC AEROBE CULT: ABNORMAL
BACTERIA SPEC AEROBE CULT: ABNORMAL

## 2022-06-19 PROCEDURE — 25010000002 CEFTRIAXONE PER 250 MG

## 2022-06-19 PROCEDURE — G0378 HOSPITAL OBSERVATION PER HR: HCPCS

## 2022-06-19 PROCEDURE — 99217 PR OBSERVATION CARE DISCHARGE MANAGEMENT: CPT | Performed by: INTERNAL MEDICINE

## 2022-06-19 RX ORDER — CEFUROXIME AXETIL 250 MG/1
TABLET ORAL EVERY 12 HOURS SCHEDULED
Status: CANCELLED | OUTPATIENT
Start: 2022-06-19

## 2022-06-19 RX ORDER — FLUCONAZOLE 100 MG/1
150 TABLET ORAL ONCE
Qty: 2 TABLET | Refills: 0 | Status: SHIPPED | OUTPATIENT
Start: 2022-06-19 | End: 2022-06-20

## 2022-06-19 RX ORDER — CEFUROXIME AXETIL 500 MG/1
500 TABLET ORAL EVERY 12 HOURS SCHEDULED
Qty: 12 TABLET | Refills: 0 | Status: SHIPPED | OUTPATIENT
Start: 2022-06-20 | End: 2022-06-26

## 2022-06-19 RX ORDER — CEFUROXIME AXETIL 250 MG/1
500 TABLET ORAL EVERY 12 HOURS SCHEDULED
Status: DISCONTINUED | OUTPATIENT
Start: 2022-06-20 | End: 2022-06-19 | Stop reason: HOSPADM

## 2022-06-19 RX ADMIN — Medication 10 ML: at 10:23

## 2022-06-19 RX ADMIN — FLUTICASONE PROPIONATE 2 SPRAY: 50 SPRAY, METERED NASAL at 10:58

## 2022-06-19 RX ADMIN — SERTRALINE HYDROCHLORIDE 50 MG: 50 TABLET ORAL at 10:22

## 2022-06-19 RX ADMIN — ASPIRIN 81 MG CHEWABLE TABLET 81 MG: 81 TABLET CHEWABLE at 10:22

## 2022-06-19 RX ADMIN — CEFTRIAXONE 2 G: 2 INJECTION, POWDER, FOR SOLUTION INTRAMUSCULAR; INTRAVENOUS at 05:12

## 2022-06-19 RX ADMIN — LOSARTAN POTASSIUM 50 MG: 50 TABLET, FILM COATED ORAL at 10:22

## 2022-06-19 RX ADMIN — FERROUS SULFATE TAB 325 MG (65 MG ELEMENTAL FE) 325 MG: 325 (65 FE) TAB at 10:23

## 2022-06-19 RX ADMIN — AMLODIPINE BESYLATE 5 MG: 5 TABLET ORAL at 10:23

## 2022-06-19 RX ADMIN — CHOLESTYRAMINE 4 G: 4 POWDER, FOR SUSPENSION ORAL at 10:22

## 2022-06-19 RX ADMIN — LEVOTHYROXINE SODIUM 125 MCG: 125 TABLET ORAL at 05:11

## 2022-06-19 RX ADMIN — CETIRIZINE HYDROCHLORIDE, PSEUDOEPHEDRINE HYDROCHLORIDE 1 TABLET: 5; 120 TABLET, FILM COATED, EXTENDED RELEASE ORAL at 10:22

## 2022-06-19 NOTE — PLAN OF CARE
Problem: Adult Inpatient Plan of Care  Goal: Absence of Hospital-Acquired Illness or Injury  Intervention: Identify and Manage Fall Risk  Recent Flowsheet Documentation  Taken 6/19/2022 0400 by Krystle Washburn, RN  Safety Promotion/Fall Prevention:   activity supervised   assistive device/personal items within reach   clutter free environment maintained   fall prevention program maintained   nonskid shoes/slippers when out of bed   room organization consistent   safety round/check completed  Taken 6/19/2022 0200 by Krystle Washburn, RN  Safety Promotion/Fall Prevention:   assistive device/personal items within reach   activity supervised   clutter free environment maintained   fall prevention program maintained   nonskid shoes/slippers when out of bed   room organization consistent   safety round/check completed  Taken 6/19/2022 0000 by Krystle Washburn, RN  Safety Promotion/Fall Prevention:   activity supervised   assistive device/personal items within reach   clutter free environment maintained   fall prevention program maintained   nonskid shoes/slippers when out of bed   room organization consistent   safety round/check completed  Taken 6/18/2022 2216 by Krystle Washburn, RN  Safety Promotion/Fall Prevention:   activity supervised   assistive device/personal items within reach   clutter free environment maintained   fall prevention program maintained   nonskid shoes/slippers when out of bed   room organization consistent   safety round/check completed  Taken 6/18/2022 2004 by Krystle Washburn, RN  Safety Promotion/Fall Prevention:   clutter free environment maintained   activity supervised   assistive device/personal items within reach   fall prevention program maintained   nonskid shoes/slippers when out of bed   room organization consistent   safety round/check completed  Intervention: Prevent Skin Injury  Recent Flowsheet Documentation  Taken 6/19/2022 0400 by Krystle Washburn, RN  Body Position: position changed  independently  Skin Protection:   adhesive use limited   protective footwear used  Taken 6/19/2022 0200 by Krystle Washburn RN  Body Position: position changed independently  Skin Protection:   adhesive use limited   protective footwear used  Taken 6/19/2022 0000 by Krystle Washburn RN  Body Position: position changed independently  Skin Protection:   adhesive use limited   protective footwear used   tubing/devices free from skin contact  Taken 6/18/2022 2216 by Krystle Washburn RN  Body Position: position changed independently  Skin Protection:   adhesive use limited   tubing/devices free from skin contact   protective footwear used  Taken 6/18/2022 2004 by Krystle Washburn RN  Body Position: position changed independently  Skin Protection:   adhesive use limited   incontinence pads utilized   protective footwear used  Intervention: Prevent and Manage VTE (Venous Thromboembolism) Risk  Recent Flowsheet Documentation  Taken 6/19/2022 0400 by Krystle Washburn RN  Activity Management: activity adjusted per tolerance  Taken 6/19/2022 0200 by Krystle Washburn RN  Activity Management: activity adjusted per tolerance  Taken 6/19/2022 0000 by Krystle Washburn RN  Activity Management: activity adjusted per tolerance  Taken 6/18/2022 2216 by Krystle Washburn RN  Activity Management: activity adjusted per tolerance  Taken 6/18/2022 2004 by Krystle Washburn RN  Activity Management: activity adjusted per tolerance  VTE Prevention/Management:   bilateral   dorsiflexion/plantar flexion performed  Intervention: Prevent Infection  Recent Flowsheet Documentation  Taken 6/19/2022 0400 by Krystle Washburn RN  Infection Prevention:   visitors restricted/screened   single patient room provided   rest/sleep promoted   personal protective equipment utilized   hand hygiene promoted   equipment surfaces disinfected   environmental surveillance performed  Taken 6/19/2022 0200 by Krystle Washburn RN  Infection Prevention:   visitors  restricted/screened   single patient room provided   personal protective equipment utilized   rest/sleep promoted   hand hygiene promoted   environmental surveillance performed   equipment surfaces disinfected  Taken 6/19/2022 0000 by Krystle Washburn RN  Infection Prevention:   hand hygiene promoted   personal protective equipment utilized   single patient room provided   rest/sleep promoted   visitors restricted/screened   equipment surfaces disinfected   environmental surveillance performed  Taken 6/18/2022 2216 by Krystle Washburn RN  Infection Prevention:   visitors restricted/screened   single patient room provided   rest/sleep promoted   personal protective equipment utilized   hand hygiene promoted   equipment surfaces disinfected   environmental surveillance performed  Taken 6/18/2022 2004 by Krystle Washburn RN  Infection Prevention:   visitors restricted/screened   single patient room provided   rest/sleep promoted   personal protective equipment utilized   hand hygiene promoted   environmental surveillance performed  Goal: Optimal Comfort and Wellbeing  Intervention: Provide Person-Centered Care  Recent Flowsheet Documentation  Taken 6/18/2022 2216 by Krystle Washburn RN  Trust Relationship/Rapport:   care explained   choices provided   questions encouraged   questions answered   emotional support provided   empathic listening provided   reassurance provided   thoughts/feelings acknowledged  Taken 6/18/2022 2004 by Krystle Washburn RN  Trust Relationship/Rapport:   care explained   choices provided   questions encouraged   questions answered   reassurance provided   thoughts/feelings acknowledged   empathic listening provided   emotional support provided  Goal: Absence of Hospital-Acquired Illness or Injury  Intervention: Identify and Manage Fall Risk  Recent Flowsheet Documentation  Taken 6/19/2022 0400 by Krystle Washburn RN  Safety Promotion/Fall Prevention:   activity supervised   assistive device/personal  items within reach   clutter free environment maintained   fall prevention program maintained   nonskid shoes/slippers when out of bed   room organization consistent   safety round/check completed  Taken 6/19/2022 0200 by Krystle Washburn RN  Safety Promotion/Fall Prevention:   assistive device/personal items within reach   activity supervised   clutter free environment maintained   fall prevention program maintained   nonskid shoes/slippers when out of bed   room organization consistent   safety round/check completed  Taken 6/19/2022 0000 by Krystle Washburn RN  Safety Promotion/Fall Prevention:   activity supervised   assistive device/personal items within reach   clutter free environment maintained   fall prevention program maintained   nonskid shoes/slippers when out of bed   room organization consistent   safety round/check completed  Taken 6/18/2022 2216 by Krystle Washburn RN  Safety Promotion/Fall Prevention:   activity supervised   assistive device/personal items within reach   clutter free environment maintained   fall prevention program maintained   nonskid shoes/slippers when out of bed   room organization consistent   safety round/check completed  Taken 6/18/2022 2004 by Krystle Washburn RN  Safety Promotion/Fall Prevention:   clutter free environment maintained   activity supervised   assistive device/personal items within reach   fall prevention program maintained   nonskid shoes/slippers when out of bed   room organization consistent   safety round/check completed  Intervention: Prevent Skin Injury  Recent Flowsheet Documentation  Taken 6/19/2022 0400 by Krystle Washburn RN  Body Position: position changed independently  Skin Protection:   adhesive use limited   protective footwear used  Taken 6/19/2022 0200 by Krystle Washburn RN  Body Position: position changed independently  Skin Protection:   adhesive use limited   protective footwear used  Taken 6/19/2022 0000 by Krystle Washburn RN  Body Position:  position changed independently  Skin Protection:   adhesive use limited   protective footwear used   tubing/devices free from skin contact  Taken 6/18/2022 2216 by Krystle Washburn RN  Body Position: position changed independently  Skin Protection:   adhesive use limited   tubing/devices free from skin contact   protective footwear used  Taken 6/18/2022 2004 by Krystle Washburn RN  Body Position: position changed independently  Skin Protection:   adhesive use limited   incontinence pads utilized   protective footwear used  Intervention: Prevent and Manage VTE (Venous Thromboembolism) Risk  Recent Flowsheet Documentation  Taken 6/19/2022 0400 by Krystle Washburn RN  Activity Management: activity adjusted per tolerance  Taken 6/19/2022 0200 by Krystle Washburn RN  Activity Management: activity adjusted per tolerance  Taken 6/19/2022 0000 by Krystle Washburn RN  Activity Management: activity adjusted per tolerance  Taken 6/18/2022 2216 by Krystle Washburn RN  Activity Management: activity adjusted per tolerance  Taken 6/18/2022 2004 by Krystle Washburn RN  Activity Management: activity adjusted per tolerance  VTE Prevention/Management:   bilateral   dorsiflexion/plantar flexion performed  Intervention: Prevent Infection  Recent Flowsheet Documentation  Taken 6/19/2022 0400 by Krystle Washburn RN  Infection Prevention:   visitors restricted/screened   single patient room provided   rest/sleep promoted   personal protective equipment utilized   hand hygiene promoted   equipment surfaces disinfected   environmental surveillance performed  Taken 6/19/2022 0200 by Krystle Washburn RN  Infection Prevention:   visitors restricted/screened   single patient room provided   personal protective equipment utilized   rest/sleep promoted   hand hygiene promoted   environmental surveillance performed   equipment surfaces disinfected  Taken 6/19/2022 0000 by Krystle Washburn RN  Infection Prevention:   hand hygiene promoted   personal  protective equipment utilized   single patient room provided   rest/sleep promoted   visitors restricted/screened   equipment surfaces disinfected   environmental surveillance performed  Taken 6/18/2022 2216 by Krystle Washburn RN  Infection Prevention:   visitors restricted/screened   single patient room provided   rest/sleep promoted   personal protective equipment utilized   hand hygiene promoted   equipment surfaces disinfected   environmental surveillance performed  Taken 6/18/2022 2004 by Krystle Washburn RN  Infection Prevention:   visitors restricted/screened   single patient room provided   rest/sleep promoted   personal protective equipment utilized   hand hygiene promoted   environmental surveillance performed  Goal: Optimal Comfort and Wellbeing  Intervention: Provide Person-Centered Care  Recent Flowsheet Documentation  Taken 6/18/2022 2216 by Krystle Washburn RN  Trust Relationship/Rapport:   care explained   choices provided   questions encouraged   questions answered   emotional support provided   empathic listening provided   reassurance provided   thoughts/feelings acknowledged  Taken 6/18/2022 2004 by Krystle Washburn RN  Trust Relationship/Rapport:   care explained   choices provided   questions encouraged   questions answered   reassurance provided   thoughts/feelings acknowledged   empathic listening provided   emotional support provided   Goal Outcome Evaluation:      VSS, on 2L o2 HS. Uneventful evening with no reports of pain or discomfort. UA culture results still pending at this time.

## 2022-06-19 NOTE — DISCHARGE SUMMARY
[facility] Medicine Services  DISCHARGE SUMMARY    Patient Name: Shireen Davenport  : 1955  MRN: [id number]    Date of Admission: 2022 12:18 AM  Date of Discharge:  2022  Primary Care Physician: Provider, No Known    Consults     No orders found from 2022 to 2022.          Hospital Course     Presenting Problem:   Pyelonephritis [N12]    Active Hospital Problems    Diagnosis  POA   • Pyelonephritis [N12]  Yes   • Leukocytosis [D72.829]  Yes   • UTI (urinary tract infection), bacterial [N39.0, A49.9]  Yes   • Enteritis [K52.9]  Yes   • Diarrhea [R19.7]  Yes   • Hypothyroidism [E03.9]  Yes   • Hypertension [I10]  Yes      Resolved Hospital Problems   No resolved problems to display.          Hospital Course:  Shireen Davenport is a 66 y.o. female with past medical history significant for hypertension, hypothyroidism, history of multiple kidney stones with recent stone extraction in [place].  Patient also has frequent UTIs.  Patient was admitted for nausea vomiting and diarrhea and work-up at the emergency room showed nitrite and leukocyte esterase in the urine.  CT scan also shows evidence of left pyelonephritis.  Patient was admitted and was started on Rocephin.     *Nausea vomiting and initially left costovertebral tenderness secondary to pyelonephritis.    --Patient initially on Rocephin and tolerated well.  Symptoms have improved significantly.  UA was positive for nitrite and leukocyte esterase.    --urine cx noted with 2 strains of kleb PNA, reviewed and d/w lab. Sensitivities noted with susceptibility to cephalosporins. Patient transitioned to oral ceftin to complete 10 total days of therapy. She reported difficulty in the past with yeast infections with abx and a one time dose of diflucan written at discharge     *History of kidney stones and recent history of stone extraction.  Patient also reports multiple UTIs.  Patient has a urologist in [place] whom she  follows with.     *Diarrhea possibly secondary to viral enteritis.  Patient tells me that she has chronic diarrhea Not an issue this hospitalization.    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 Instructed her that she could d/w her GI doctor and pursue further testing with c-scope. Patient reports ? Of Chron's diagnosis in the past but this was not definitive     *Hypertension, currently controlled. All home medications resumed at discharge     *Hypothyroidism on home dose Synthroid.     *Severe obesity     *Depression, on Zoloft.     Hopefully home as soon as culture results- later today vs tomorrow      Discharge Follow Up Recommendations for outpatient labs/diagnostics:  PCP in [place]- patient to travel home and f/u with them    Day of Discharge     HPI:   Doing fine. Daughter and son in law updated to POC via speakerphone in room. Patient feels up to discharging home today.     Review of Systems  Gen- No fevers, chills  CV- No chest pain, palpitations  Resp- No cough, dyspnea  GI- No N/V/D, abd pain        Vital Signs:   Temp:  [97.8 °F (36.6 °C)-98.3 °F (36.8 °C)] 98 °F (36.7 °C)  Heart Rate:  [57-76] 69  Resp:  [15-20] 18  BP: (112-154)/(59-84) 112/59  Flow (L/min):  [1-2] 1      Physical Exam:  GEN: NAD, resting in chair, awake  HEENT: on room air, atraumatic, normocephalic  NECK: supple, no masses  RESP: on room air, normal effort  CV: on tele, sinus rhythm  PSYCH: normal affect, appropriate  NEURO: awake, alert, no focal deficits noted  MSK: no edema noted  SKIN: no rashes noted       Pertinent  and/or Most Recent Results     LAB RESULTS:      Lab 06/16/22  0611 06/16/22  0412 06/15/22  2043   WBC 9.15  --  11.18*   HEMOGLOBIN 10.4*  --  11.4*   HEMATOCRIT 32.6*  --  36.0   PLATELETS 313  --  370   NEUTROS ABS  --   --  7.72*   IMMATURE GRANS (ABS)  --   --  0.04   LYMPHS ABS  --   --  2.48   MONOS ABS  --   --  0.88    EOS ABS  --   --  0.04   MCV 83.6  --  84.9   LACTATE  --  1.4 1.9         Lab 06/17/22  0603 06/16/22  0611 06/15/22  2043   SODIUM  --  137 138   POTASSIUM 3.7 3.3* 3.5   CHLORIDE  --  105 104   CO2  --  25.0 24.0   ANION GAP  --  7.0 10.0   BUN  --  14 13   CREATININE  --  0.80 0.86   EGFR  --  80.9 74.2   GLUCOSE  --  100* 103*   CALCIUM  --  8.8 9.1   MAGNESIUM  --   --  1.9         Lab 06/15/22  2043   TOTAL PROTEIN 6.9   ALBUMIN 3.70   GLOBULIN 3.2   ALT (SGPT) 16   AST (SGOT) 18   BILIRUBIN 0.4   ALK PHOS 130*   LIPASE 16         Lab 06/15/22  2043   TROPONIN T <0.010                 Brief Urine Lab Results  (Last result in the past 365 days)      Color   Clarity   Blood   Leuk Est   Nitrite   Protein   CREAT   Urine HCG        06/16/22 0251 Yellow   Turbid   Negative   Moderate (2+)   Positive   30 mg/dL (1+)               Microbiology Results (last 10 days)     Procedure Component Value - Date/Time    Blood Culture - Blood, Hand, Right [[id number]]  (Normal) Collected: 06/16/22 0412    Lab Status: Preliminary result Specimen: Blood from Hand, Right Updated: 06/19/22 0430     Blood Culture No growth at 3 days    Blood Culture - Blood, Blood, Venous Line [[id number]]  (Normal) Collected: 06/16/22 0412    Lab Status: Preliminary result Specimen: Blood, Venous Line Updated: 06/19/22 0430     Blood Culture No growth at 3 days    Urine Culture - Urine, Urine, Clean Catch [[id number]]  (Abnormal)  (Susceptibility) Collected: 06/16/22 0251    Lab Status: Final result Specimen: Urine, Clean Catch Updated: 06/19/22 0816     Urine Culture >100,000 CFU/mL Klebsiella pneumoniae ssp pneumoniae     Comment: Strain 1.         >100,000 CFU/mL Klebsiella pneumoniae ssp pneumoniae     Comment: Strain 2.       Narrative:      Colonization of the urinary tract without infection is common. Treatment is discouraged unless the patient is symptomatic, pregnant, or undergoing an invasive urologic procedure.    Susceptibility       Klebsiella pneumoniae ssp pneumoniae (1)      SHARLENE      Ampicillin Resistant     Ampicillin + Sulbactam Susceptible      Cefazolin Susceptible      Cefepime Susceptible      Ceftazidime Susceptible      Ceftriaxone Susceptible      Gentamicin Susceptible      Levofloxacin Susceptible      Nitrofurantoin Intermediate      Piperacillin + Tazobactam Susceptible      Trimethoprim + Sulfamethoxazole Resistant                      Susceptibility      Klebsiella pneumoniae ssp pneumoniae (2)      SHARLENE      Ampicillin Resistant     Ampicillin + Sulbactam Resistant     Cefazolin Susceptible      Cefepime Susceptible      Ceftazidime Susceptible      Ceftriaxone Susceptible      Gentamicin Susceptible      Levofloxacin Susceptible      Nitrofurantoin Susceptible      Piperacillin + Tazobactam Susceptible      Trimethoprim + Sulfamethoxazole Resistant                          COVID PRE-OP / PRE-PROCEDURE SCREENING ORDER (NO ISOLATION) - Swab, Nasopharynx [[id number]]  (Normal) Collected: 06/16/22 0045    Lab Status: Final result Specimen: Swab from Nasopharynx Updated: 06/16/22 0139    Narrative:      The following orders were created for panel order COVID PRE-OP / PRE-PROCEDURE SCREENING ORDER (NO ISOLATION) - Swab, Nasopharynx.  Procedure                               Abnormality         Status                     ---------                               -----------         ------                     COVID-19, FLU A/B, RSV P...[[id number]]  Normal              Final result                 Please view results for these tests on the individual orders.    COVID-19, FLU A/B, RSV PCR - Swab, Nasopharynx [[id number]]  (Normal) Collected: 06/16/22 0045    Lab Status: Final result Specimen: Swab from Nasopharynx Updated: 06/16/22 0139     COVID19 Not Detected     Influenza A PCR Not Detected     Influenza B PCR Not Detected     RSV, PCR Not Detected    Narrative:      Fact sheet for providers: https://www.fda.gov/media/157914/download     Fact sheet for patients: https://www.fda.gov/media/532436/download    Test performed by [facility].          CT Abdomen Pelvis With Contrast    Result Date: 6/16/2022  EXAMINATION: CT SCAN OF THE ABDOMEN AND PELVIS WITH INTRAVENOUS CONTRAST DATE OF EXAM: 6/16/2022 1:02 AM HISTORY: Severe lower abdominal pain. Nausea. Vomiting. Diarrhea. Appendectomy. Cholecystectomy. COMPARISON: None. TECHNIQUE: CT examination of the abdomen and pelvis was performed following the intravenous administration of 100 mL of Isovue-300. CT dose lowering techniques were used, to include: automated exposure control, adjustment for patient size, and/or use of iterative reconstruction. FINDINGS: ABDOMEN/PELVIS: Lower Chest: Small hiatal hernia. Liver: Normal. Gallbladder/Billary: Cholecystectomy. Pancreas: Normal. Spleen: Calcified granuloma. Adrenal Glands: Normal. Kidneys: Moderate scarring of the left kidney. Mildly heterogenous enhancement of the left kidney. Scattered renal cysts in the kidneys. GI Tract: The stomach and duodenum are unremarkable. Moderate diverticulosis of the sigmoid colon. Fluid in the lumen of the colon compatible with diarrhea. Mild thickening of the walls of a segment of the ileum is seen (900/84-68). Mesentery/Peritoneum: Normal. Vasculature: Advanced atherosclerosis of the abdominal aorta. Lymph Nodes: Normal. Abdominal Wall: Tiny indirect right inguinal hernia containing only fat. Bladder: Normal. Reproductive: Hysterectomy. Unremarkable adnexa. Musculoskeletal: Normal.     1. Findings compatible with mild pyelonephritis involving the left kidney. Moderate scarring of the left kidney is compatible with prior pyelonephritis. 2. Findings compatible with mild enteritis involving the distal ileum. The central mesenteric vessels are patent. Infectious and inflammatory etiologies are favored. Electronically signed by:  Ortiz Sinclair M.D.  6/15/2022 11:36 PM Mountain Time                  Plan for Follow-up of Pending  Labs/Results: will be called with any abnormal results which require f/u    Pending Labs     Order Current Status    Inflammatory Bowel Disease Panel In process    Blood Culture - Blood, Blood, Venous Line Preliminary result    Blood Culture - Blood, Hand, Right Preliminary result        Discharge Details        Discharge Medications      New Medications      Instructions Start Date   cefuroxime 500 MG tablet  Commonly known as: CEFTIN   500 mg, Oral, Every 12 Hours Scheduled   Start Date: June 20, 2022     fluconazole 100 MG tablet  Commonly known as: Diflucan   150 mg, Oral, Once         Changes to Medications      Instructions Start Date   levothyroxine 125 MCG tablet  Commonly known as: SYNTHROID, LEVOTHROID  What changed: when to take this   125 mcg, Oral, Daily      spironolactone 50 MG tablet  Commonly known as: ALDACTONE  What changed: when to take this   50 mg, Oral, Daily         Continue These Medications      Instructions Start Date   amLODIPine 5 MG tablet  Commonly known as: NORVASC   5 mg, Oral, Daily      aspirin 81 MG chewable tablet   81 mg, Oral, Daily      cetirizine 5 MG tablet  Commonly known as: zyrTEC   5 mg, Oral, Daily      colestipol 1 g tablet  Commonly known as: COLESTID   1 g, Oral, 2 Times Daily      conjugated estrogens 0.625 MG/GM vaginal cream  Commonly known as: Premarin   0.5 grams by vaginal route 2 X week      fluticasone 50 MCG/ACT nasal spray  Commonly known as: FLONASE   2 sprays, Nasal, Daily      irbesartan 150 MG tablet  Commonly known as: AVAPRO   150 mg, Oral, Daily      sertraline 50 MG tablet  Commonly known as: ZOLOFT   50 mg, Oral, Daily             Allergies   Allergen Reactions   • Codeine GI Intolerance   • Penicillins    • Sulfa Antibiotics Itching         Discharge Disposition:  Home or Self Care    Diet:  Hospital:  Diet Order   Procedures   • Diet Regular; Cardiac       Activity:  As tolerated    Restrictions or Other Recommendations:  As tolerated       CODE  STATUS:    Code Status and Medical Interventions:   Ordered at: 06/16/22 0509     Level Of Support Discussed With:    Patient     Code Status (Patient has no pulse and is not breathing):    CPR (Attempt to Resuscitate)     Medical Interventions (Patient has pulse or is breathing):    Full Support       No future appointments.    Additional Instructions for the Follow-ups that You Need to Schedule     Discharge Follow-up with PCP   As directed       Currently Documented PCP:    Provider, No Known    PCP Phone Number:    None     Follow Up Details: 1-2 weeks in [place]                     Kristin Vieira MD  06/19/22      Time Spent on Discharge:  I spent  25  minutes on this discharge activity which included: face-to-face encounter with the patient, reviewing the data in the system, coordination of the care with the nursing staff as well as consultants, documentation, and entering orders.

## 2022-06-20 LAB
BAKER'S YEAST IGA QN: 79.3 UNITS (ref 0–24.9)
BAKER'S YEAST IGG QN: <20 UNITS (ref 0–24.9)
P-ANCA ATYPICAL TITR SER IF: ABNORMAL TITER

## 2022-06-21 LAB
BACTERIA SPEC AEROBE CULT: NORMAL
BACTERIA SPEC AEROBE CULT: NORMAL

## 2022-07-12 ENCOUNTER — DASHBOARD ENCOUNTERS (OUTPATIENT)
Age: 67
End: 2022-07-12

## 2022-07-13 ENCOUNTER — TELEPHONE ENCOUNTER (OUTPATIENT)
Dept: URBAN - METROPOLITAN AREA CLINIC 92 | Facility: CLINIC | Age: 67
End: 2022-07-13

## 2022-07-13 ENCOUNTER — OFFICE VISIT (OUTPATIENT)
Dept: URBAN - METROPOLITAN AREA TELEHEALTH 2 | Facility: TELEHEALTH | Age: 67
End: 2022-07-13
Payer: MEDICARE

## 2022-07-13 ENCOUNTER — LAB OUTSIDE AN ENCOUNTER (OUTPATIENT)
Dept: URBAN - METROPOLITAN AREA TELEHEALTH 2 | Facility: TELEHEALTH | Age: 67
End: 2022-07-13

## 2022-07-13 VITALS — HEIGHT: 66 IN | BODY MASS INDEX: 43.71 KG/M2 | WEIGHT: 272 LBS

## 2022-07-13 DIAGNOSIS — K50.80 CROHN'S DISEASE OF BOTH SMALL AND LARGE INTESTINE WITHOUT COMPLICATION: ICD-10-CM

## 2022-07-13 PROCEDURE — 99442 PHONE E/M BY PHYS 11-20 MIN: CPT | Performed by: INTERNAL MEDICINE

## 2022-07-13 RX ORDER — MONTELUKAST 10 MG/1
1 TABLET TABLET, FILM COATED ORAL ONCE A DAY
Status: ACTIVE | COMMUNITY

## 2022-07-13 RX ORDER — LEVOTHYROXINE SODIUM 150 UG/1
1 TABLET IN THE MORNING ON AN EMPTY STOMACH TABLET ORAL ONCE A DAY
Status: ACTIVE | COMMUNITY

## 2022-07-13 RX ORDER — CETIRIZINE HYDROCHLORIDE 10 MG/1
1 TABLET TABLET, FILM COATED ORAL ONCE A DAY
Status: ACTIVE | COMMUNITY

## 2022-07-13 NOTE — HPI-TODAY'S VISIT:
hosp in [place]  with uti  question c diff  has had before  better now  going to work  due for colon  some diarhea not bad

## 2022-07-15 ENCOUNTER — TELEPHONE ENCOUNTER (OUTPATIENT)
Dept: URBAN - METROPOLITAN AREA CLINIC 79 | Facility: CLINIC | Age: 67
End: 2022-07-15

## 2022-07-18 ENCOUNTER — LAB OUTSIDE AN ENCOUNTER (OUTPATIENT)
Dept: URBAN - METROPOLITAN AREA CLINIC 126 | Facility: CLINIC | Age: 67
End: 2022-07-18

## 2022-07-21 ENCOUNTER — TELEPHONE ENCOUNTER (OUTPATIENT)
Dept: URBAN - METROPOLITAN AREA CLINIC 80 | Facility: CLINIC | Age: 67
End: 2022-07-21

## 2022-07-21 LAB
C. DIFFICILE TOXIN A/B, STOOL - QDX: NEGATIVE
GASTROINTESTINAL PATHOGEN: (no result)

## 2022-07-22 PROBLEM — 71833008: Status: ACTIVE | Noted: 2022-07-13

## 2022-09-13 ENCOUNTER — OFFICE VISIT (OUTPATIENT)
Dept: URBAN - METROPOLITAN AREA SURGERY CENTER 19 | Facility: SURGERY CENTER | Age: 67
End: 2022-09-13
Payer: MEDICARE

## 2022-09-13 ENCOUNTER — CLAIMS CREATED FROM THE CLAIM WINDOW (OUTPATIENT)
Dept: URBAN - METROPOLITAN AREA CLINIC 4 | Facility: CLINIC | Age: 67
End: 2022-09-13
Payer: MEDICARE

## 2022-09-13 DIAGNOSIS — D12.2 ADENOMA OF ASCENDING COLON: ICD-10-CM

## 2022-09-13 DIAGNOSIS — D12.2 BENIGN NEOPLASM OF ASCENDING COLON: ICD-10-CM

## 2022-09-13 DIAGNOSIS — R19.7 ACUTE DIARRHEA: ICD-10-CM

## 2022-09-13 DIAGNOSIS — K63.89 OTHER SPECIFIED DISEASES OF INTESTINE: ICD-10-CM

## 2022-09-13 PROCEDURE — 88305 TISSUE EXAM BY PATHOLOGIST: CPT | Performed by: PATHOLOGY

## 2022-09-13 PROCEDURE — 45380 COLONOSCOPY AND BIOPSY: CPT | Performed by: INTERNAL MEDICINE

## 2022-09-13 PROCEDURE — G8907 PT DOC NO EVENTS ON DISCHARG: HCPCS | Performed by: INTERNAL MEDICINE

## 2022-09-13 RX ORDER — LEVOTHYROXINE SODIUM 150 UG/1
1 TABLET IN THE MORNING ON AN EMPTY STOMACH TABLET ORAL ONCE A DAY
Status: ACTIVE | COMMUNITY

## 2022-09-13 RX ORDER — CETIRIZINE HYDROCHLORIDE 10 MG/1
1 TABLET TABLET, FILM COATED ORAL ONCE A DAY
Status: ACTIVE | COMMUNITY

## 2022-09-13 RX ORDER — MONTELUKAST 10 MG/1
1 TABLET TABLET, FILM COATED ORAL ONCE A DAY
Status: ACTIVE | COMMUNITY

## 2022-10-19 ENCOUNTER — TELEPHONE ENCOUNTER (OUTPATIENT)
Dept: URBAN - METROPOLITAN AREA SURGERY CENTER 19 | Facility: SURGERY CENTER | Age: 67
End: 2022-10-19